# Patient Record
Sex: FEMALE | Race: WHITE | NOT HISPANIC OR LATINO
[De-identification: names, ages, dates, MRNs, and addresses within clinical notes are randomized per-mention and may not be internally consistent; named-entity substitution may affect disease eponyms.]

---

## 2018-12-14 ENCOUNTER — RESULT REVIEW (OUTPATIENT)
Age: 58
End: 2018-12-14

## 2019-10-23 ENCOUNTER — RECORD ABSTRACTING (OUTPATIENT)
Age: 59
End: 2019-10-23

## 2019-10-23 DIAGNOSIS — Z80.0 FAMILY HISTORY OF MALIGNANT NEOPLASM OF DIGESTIVE ORGANS: ICD-10-CM

## 2019-10-23 DIAGNOSIS — Z87.891 PERSONAL HISTORY OF NICOTINE DEPENDENCE: ICD-10-CM

## 2019-10-23 DIAGNOSIS — Z80.8 FAMILY HISTORY OF MALIGNANT NEOPLASM OF OTHER ORGANS OR SYSTEMS: ICD-10-CM

## 2019-10-23 PROBLEM — Z00.00 ENCOUNTER FOR PREVENTIVE HEALTH EXAMINATION: Status: ACTIVE | Noted: 2019-10-23

## 2019-10-23 RX ORDER — CHROMIUM 200 MCG
1000 TABLET ORAL DAILY
Refills: 0 | Status: ACTIVE | COMMUNITY

## 2019-10-25 ENCOUNTER — APPOINTMENT (OUTPATIENT)
Dept: ENDOCRINOLOGY | Facility: CLINIC | Age: 59
End: 2019-10-25
Payer: COMMERCIAL

## 2019-10-25 VITALS
SYSTOLIC BLOOD PRESSURE: 135 MMHG | BODY MASS INDEX: 28.68 KG/M2 | HEIGHT: 64 IN | WEIGHT: 168 LBS | DIASTOLIC BLOOD PRESSURE: 80 MMHG | HEART RATE: 70 BPM

## 2019-10-25 PROCEDURE — 99214 OFFICE O/P EST MOD 30 MIN: CPT

## 2019-11-01 NOTE — ASSESSMENT
[FreeTextEntry1] : ~\par Striving to lose weight, but without success.\par This has been frustrating.\par I explained current medical options, which unfortunately, are limited.\par She is very much interested to try phentermine.\par Risks, benefits, options discussed and she will start on 1/4 of 37 mg tablet and\par notify me over weekend as to how she is doing.

## 2019-11-01 NOTE — PHYSICAL EXAM
[Alert] : alert [No Acute Distress] : no acute distress [Well Nourished] : well nourished [Well Developed] : well developed [Healthy Appearance] : healthy appearance [Normal Voice/Communication] : normal voice communication [No Proptosis] : no proptosis [No Lid Lag] : no lid lag [No Neck Mass] : no neck mass was observed [No Respiratory Distress] : no respiratory distress [Normal Rate and Effort] : normal respiratory rhythm and effort [No Accessory Muscle Use] : no accessory muscle use [Normal Rate] : heart rate was normal  [Regular Rhythm] : with a regular rhythm [No Edema] : there was no peripheral edema [No Stigmata of Cushings Syndrome] : no stigmata of cushings syndrome [Oriented x3] : oriented to person, place, and time [Normal Insight/Judgement] : insight and judgment were intact [Normal Affect] : the affect was normal [Normal Mood] : the mood was normal

## 2019-11-01 NOTE — HISTORY OF PRESENT ILLNESS
[FreeTextEntry1] : Oct 25, 2019\par \par          PCP: Dr. Asad Haywood\par             Gyn: Dr. Sara Roman\par            .\par            CC: Weight gain; acne\par \par Here last Fall at which time labs all wnl.\par Had recent near syncopal episode at [facility] after donating blood.  \par            .\par I don't want to go on keto diet.  I don't eat meat.  My daughter is a paramedic, so she can check my blood pressure.\par \par Plan:  She would like phentermine.  Warned of potential side effects.\par \par \par Previous notes from [employer] appended below.\par \par   Sept 12, 2018\par            .\par            PCP: Dr. Lisa Alvarado\par             Gyn: Dr. 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 \par            Her mother sees me for bone density issues.\par            Available old records reviewed and not presented again here. \par            .\par            Mother of two (18 and 21).\par            .\par            5 ft 3 in tall; about 125 after high school.\par            Started gaining weight about two years ago. \par            Today 178 lbs.\par            Not certain of year of menopause as she was on BCP.\par            No hot flashes.\par            .\par            Impression: Reason for weight gain, acne not clear.\par            .\par            Plan: Will start evaluation by checking androgens, cortisol.\par            Possible 24 hour urine free cortisol in future as well as overnight dexamethasone suppression test. \par            .

## 2019-11-19 ENCOUNTER — APPOINTMENT (OUTPATIENT)
Dept: ENDOCRINOLOGY | Facility: CLINIC | Age: 59
End: 2019-11-19
Payer: COMMERCIAL

## 2019-11-19 VITALS
HEART RATE: 80 BPM | HEIGHT: 64 IN | DIASTOLIC BLOOD PRESSURE: 90 MMHG | SYSTOLIC BLOOD PRESSURE: 138 MMHG | WEIGHT: 168 LBS | BODY MASS INDEX: 28.68 KG/M2

## 2019-11-19 DIAGNOSIS — Z78.9 OTHER SPECIFIED HEALTH STATUS: ICD-10-CM

## 2019-11-19 DIAGNOSIS — Z87.2 PERSONAL HISTORY OF DISEASES OF THE SKIN AND SUBCUTANEOUS TISSUE: ICD-10-CM

## 2019-11-19 PROCEDURE — 99214 OFFICE O/P EST MOD 30 MIN: CPT | Mod: 25

## 2019-11-19 PROCEDURE — 36415 COLL VENOUS BLD VENIPUNCTURE: CPT

## 2019-11-20 LAB
ALBUMIN SERPL ELPH-MCNC: 4.9 G/DL
ALP BLD-CCNC: 77 U/L
ALT SERPL-CCNC: 12 U/L
ANION GAP SERPL CALC-SCNC: 15 MMOL/L
AST SERPL-CCNC: 15 U/L
BASOPHILS # BLD AUTO: 0.06 K/UL
BASOPHILS NFR BLD AUTO: 0.7 %
BILIRUB DIRECT SERPL-MCNC: 0.1 MG/DL
BILIRUB INDIRECT SERPL-MCNC: 0.2 MG/DL
BILIRUB SERPL-MCNC: 0.3 MG/DL
BUN SERPL-MCNC: 14 MG/DL
CALCIUM SERPL-MCNC: 10.2 MG/DL
CHLORIDE SERPL-SCNC: 104 MMOL/L
CO2 SERPL-SCNC: 25 MMOL/L
CREAT SERPL-MCNC: 0.89 MG/DL
EOSINOPHIL # BLD AUTO: 0.12 K/UL
EOSINOPHIL NFR BLD AUTO: 1.4 %
FRUCTOSAMINE SERPL-MCNC: 242 UMOL/L
GLUCOSE SERPL-MCNC: 89 MG/DL
HCT VFR BLD CALC: 45.2 %
HGB BLD-MCNC: 14.4 G/DL
IMM GRANULOCYTES NFR BLD AUTO: 0.2 %
LYMPHOCYTES # BLD AUTO: 3.25 K/UL
LYMPHOCYTES NFR BLD AUTO: 38.4 %
MAN DIFF?: NORMAL
MCHC RBC-ENTMCNC: 29.3 PG
MCHC RBC-ENTMCNC: 31.9 GM/DL
MCV RBC AUTO: 92.1 FL
MONOCYTES # BLD AUTO: 0.58 K/UL
MONOCYTES NFR BLD AUTO: 6.8 %
NEUTROPHILS # BLD AUTO: 4.44 K/UL
NEUTROPHILS NFR BLD AUTO: 52.5 %
PLATELET # BLD AUTO: 250 K/UL
POTASSIUM SERPL-SCNC: 4.7 MMOL/L
PROT SERPL-MCNC: 7.2 G/DL
RBC # BLD: 4.91 M/UL
RBC # FLD: 13.6 %
SODIUM SERPL-SCNC: 144 MMOL/L
WBC # FLD AUTO: 8.47 K/UL

## 2019-11-22 PROBLEM — Z78.9 PATIENT DENIES MEDICAL PROBLEMS: Status: RESOLVED | Noted: 2019-11-19 | Resolved: 2019-11-22

## 2019-11-22 NOTE — ASSESSMENT
[FreeTextEntry1] : &\par \par Weight stable.\par No apparent side effects.\par BP on high side today, so will not increase phentirmine, but will start topiramate in PM\par Set up regular follow up visits.

## 2019-11-22 NOTE — HISTORY OF PRESENT ILLNESS
[FreeTextEntry1] : Nov 19, 2019\par \par PCP: Dr. Asad Haywood\par             Gyn: Dr. Sara Roman\par            .\par            CC: Weight gain; acne\par \par Weight by me today w clothes and shoes:  168 lbs (room 3)\par Notes her appetite is decreased in AM but medication wears off.\par Would like to increase dose.  \par BP today 138/90 ("coffee and rushing" she explains)  \par BP in past was within normal limits.\par She reports her EMT daughter has been checking her BP and has been fine.\par Will not increase dose of phentermine at present, will add topirimate 25 mg in PM for now and then\par eventually BID.    After BP monitored by me as well as evaluation by PCP, can consider raising dose of\par phentermine.  \par \par Oct 25, 2019\par \par          PCP: Dr. Asad Haywood\par             Gyn: Dr. Sara Roman\par            .\par            CC: Weight gain; acne\par \par Here last Fall at which time labs all wnl.\par Had recent near syncopal episode at [facility] after donating blood.  \par            .\par I don't want to go on keto diet.  I don't eat meat.  My daughter is a paramedic, so she can check my blood pressure.\par \par Plan:  She would like phentermine.  Warned of potential side effects.\par \par \par Previous notes from [employer] Works appended below.\par \par   Sept 12, 2018\par            .\par            PCP: Dr. Lisa Alvarado\par             Gyn: Dr. 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 \par            Her mother sees me for bone density issues.\par            Available old records reviewed and not presented again here. \par            .\par            Mother of two (18 and 21).\par            .\par            5 ft 3 in tall; about 125 after high school.\par            Started gaining weight about two years ago. \par            Today 178 lbs.\par            Not certain of year of menopause as she was on BCP.\par            No hot flashes.\par            .\par            Impression: Reason for weight gain, acne not clear.\par            .\par            Plan: Will start evaluation by checking androgens, cortisol.\par            Possible 24 hour urine free cortisol in future as well as overnight dexamethasone suppression test. \par            .

## 2019-11-22 NOTE — PHYSICAL EXAM
[Alert] : alert [No Acute Distress] : no acute distress [Well Nourished] : well nourished [Well Developed] : well developed [Healthy Appearance] : healthy appearance [Normal Voice/Communication] : normal voice communication [No Proptosis] : no proptosis [No Lid Lag] : no lid lag [No Respiratory Distress] : no respiratory distress [Normal Rate] : heart rate was normal  [Regular Rhythm] : with a regular rhythm [No Involuntary Movements] : no involuntary movements were seen [No Tremors] : no tremors [Oriented x3] : oriented to person, place, and time [Normal Insight/Judgement] : insight and judgment were intact [Normal Affect] : the affect was normal [Normal Mood] : the mood was normal

## 2019-12-13 ENCOUNTER — TRANSCRIPTION ENCOUNTER (OUTPATIENT)
Age: 59
End: 2019-12-13

## 2019-12-18 ENCOUNTER — APPOINTMENT (OUTPATIENT)
Dept: INTERNAL MEDICINE | Facility: CLINIC | Age: 59
End: 2019-12-18
Payer: COMMERCIAL

## 2019-12-18 VITALS
SYSTOLIC BLOOD PRESSURE: 128 MMHG | HEIGHT: 64 IN | BODY MASS INDEX: 27.49 KG/M2 | DIASTOLIC BLOOD PRESSURE: 78 MMHG | WEIGHT: 161 LBS

## 2019-12-18 DIAGNOSIS — M72.2 PLANTAR FASCIAL FIBROMATOSIS: ICD-10-CM

## 2019-12-18 PROCEDURE — 99386 PREV VISIT NEW AGE 40-64: CPT | Mod: 25

## 2019-12-18 PROCEDURE — 36415 COLL VENOUS BLD VENIPUNCTURE: CPT

## 2019-12-18 NOTE — HEALTH RISK ASSESSMENT
[Good] : ~his/her~ current health as good [Very Good] : ~his/her~  mood as very good [No] : In the past 12 months have you used drugs other than those required for medical reasons? No [No falls in past year] : Patient reported no falls in the past year [0] : 2) Feeling down, depressed, or hopeless: Not at all (0) [HIV test declined] : HIV test declined [Hepatitis C test declined] : Hepatitis C test declined [Patient reported mammogram was normal] : Patient reported mammogram was normal [Patient reported colonoscopy was normal] : Patient reported colonoscopy was normal [FreeTextEntry1] : none [de-identified] : none [de-identified] : regular diet  [] : No [MammogramDate] : 01/18 [PapSmearDate] : 06/19 [PapSmearComments] : Dr Roman [ColonoscopyDate] : 01/14 [ColonoscopyComments] : Dr Maher

## 2019-12-18 NOTE — PHYSICAL EXAM
[No Acute Distress] : no acute distress [Well Nourished] : well nourished [Well-Appearing] : well-appearing [Well Developed] : well developed [PERRL] : pupils equal round and reactive to light [Normal Sclera/Conjunctiva] : normal sclera/conjunctiva [Normal Outer Ear/Nose] : the outer ears and nose were normal in appearance [EOMI] : extraocular movements intact [Normal Oropharynx] : the oropharynx was normal [No Lymphadenopathy] : no lymphadenopathy [No JVD] : no jugular venous distention [Supple] : supple [No Respiratory Distress] : no respiratory distress  [Thyroid Normal, No Nodules] : the thyroid was normal and there were no nodules present [No Accessory Muscle Use] : no accessory muscle use [Normal Rate] : normal rate  [Clear to Auscultation] : lungs were clear to auscultation bilaterally [Regular Rhythm] : with a regular rhythm [No Murmur] : no murmur heard [Normal S1, S2] : normal S1 and S2 [No Varicosities] : no varicosities [No Carotid Bruits] : no carotid bruits [No Edema] : there was no peripheral edema [Pedal Pulses Present] : the pedal pulses are present [No Extremity Clubbing/Cyanosis] : no extremity clubbing/cyanosis [Soft] : abdomen soft [Non Tender] : non-tender [Non-distended] : non-distended [No HSM] : no HSM [Normal Bowel Sounds] : normal bowel sounds [Normal Anterior Cervical Nodes] : no anterior cervical lymphadenopathy [Normal Posterior Cervical Nodes] : no posterior cervical lymphadenopathy [No CVA Tenderness] : no CVA  tenderness [No Spinal Tenderness] : no spinal tenderness [No Joint Swelling] : no joint swelling [No Rash] : no rash [Coordination Grossly Intact] : coordination grossly intact [Grossly Normal Strength/Tone] : grossly normal strength/tone [No Focal Deficits] : no focal deficits [Normal Gait] : normal gait [Normal Insight/Judgement] : insight and judgment were intact [Normal Affect] : the affect was normal [Normal Appearance] : normal in appearance [No Nipple Discharge] : no nipple discharge [No Masses] : no palpable masses [No Axillary Lymphadenopathy] : no axillary lymphadenopathy [Alert and Oriented x3] : oriented to person, place, and time [Normal Mood] : the mood was normal [de-identified] : cannot visualize left TM due to cerumen [de-identified] : many moles, none suspicious

## 2019-12-18 NOTE — HISTORY OF PRESENT ILLNESS
[FreeTextEntry1] : physical [de-identified] : Pt here for annual physical. She feels well overall. No complaints. She has been seeing endo due to weight gain. She is on two meds but has not lost weight as yet. Pt is 3 years menopausal. She tries to consume a proper diet but does no exercise. She works a lot and cannot seem to find the time to exercise. \par She has some issues with plantar fasciitis and would like to see Pod\par She also req a derm recommendation for annual skin check.

## 2019-12-18 NOTE — COUNSELING
[Encouraged to increase physical activity] : Encouraged to increase physical activity [Potential consequences of obesity discussed] : Potential consequences of obesity discussed [Encouraged to use exercise tracking device] : Encouraged to use exercise tracking device [FreeTextEntry2] : .nutsc

## 2019-12-19 LAB
25(OH)D3 SERPL-MCNC: 30.6 NG/ML
CHOLEST SERPL-MCNC: 207 MG/DL
CHOLEST/HDLC SERPL: 2.7 RATIO
ESTIMATED AVERAGE GLUCOSE: 105 MG/DL
HBA1C MFR BLD HPLC: 5.3 %
HDLC SERPL-MCNC: 76 MG/DL
LDLC SERPL CALC-MCNC: 111 MG/DL
TRIGL SERPL-MCNC: 100 MG/DL
TSH SERPL-ACNC: 2.01 UIU/ML

## 2020-01-06 ENCOUNTER — RX RENEWAL (OUTPATIENT)
Age: 60
End: 2020-01-06

## 2020-01-06 RX ORDER — ACYCLOVIR 50 MG/G
5 CREAM TOPICAL
Qty: 1 | Refills: 3 | Status: ACTIVE | COMMUNITY
Start: 2020-01-06 | End: 1900-01-01

## 2020-02-12 ENCOUNTER — APPOINTMENT (OUTPATIENT)
Dept: ENDOCRINOLOGY | Facility: CLINIC | Age: 60
End: 2020-02-12

## 2020-04-20 ENCOUNTER — APPOINTMENT (OUTPATIENT)
Dept: ENDOCRINOLOGY | Facility: CLINIC | Age: 60
End: 2020-04-20
Payer: COMMERCIAL

## 2020-04-20 PROCEDURE — 99214 OFFICE O/P EST MOD 30 MIN: CPT | Mod: 95

## 2020-04-20 NOTE — ASSESSMENT
[FreeTextEntry1] : Lots of food donations at hospital....and hospital food is free....not to her benefit while trying to lose weight.

## 2020-04-20 NOTE — HISTORY OF PRESENT ILLNESS
[Home] : at home, [unfilled] , at the time of the visit. [Medical Office: ([facility])___] : at the medical office located in  [Patient] : the patient [Self] : self [FreeTextEntry1] : Apr 20, 2020\par \par This is a 21+ minute VideoChat encounter with an established patient which was initiated by the patient during a time scheduled for a visit and the patient is aware that this may be a billable encounter.  The patient has not seen a provider of my specialty (Endocrinology) within out group in the past 7 days and this encounter is not anticipated to result in a scheduled in-person visit within he next 7 days.\par The reason for this VideoChat encounter is listed below under "CC:"\par Verbal consent was discussed and obtained from the patient for this visit:  "You have chosen to receive care through the use of tele-media or telephone.   This enables health care providers at different locations to provide safe, effective, and convenient care through the use of technology.  Please note this is a billable encounter.  As with any health care service, there are risks associated with the use of tele-media or telephone, including equipment failure, poor image and/or resolution, and  issues.  You understand that I cannot physically examine you and that you may need to come to the office to complete the assessment.\par \par Patient agreed verbally to understanding the risks, benefits, alternatives of Tele-Media and telephone as explained.  All questions regarding tele-media and telephone encounters were answered.\par \par 58 yo healthcare .\par Her daughter, EMT, has been assigned to [employer] - checks her BP - most recently 138/76.\par Patient ran out of topirimate and phentermine.\par \par Plan:  Restart topirimate 25 mg in PM and phentermine 1/4 of a 37 mg tablet.\par Call me over the weekend after her daughter checks BP to consider increasing dose to 1/2 tab of phentermine.\par Schedule f/u appt for about 6-8 weeks - before she runs out of phentermine.\par \par \par \par Nov 19, 2019\par \par PCP: Dr. Asad Haywood\par             Gyn: Dr. Sara Roman\par            .\par            CC: Weight gain; acne\par \par Weight by me today w clothes and shoes:  168 lbs (room 3)\par Notes her appetite is decreased in AM but medication wears off.\par Would like to increase dose.  \par BP today 138/90 ("coffee and rushing" she explains)  \par BP in past was within normal limits.\par She reports her EMT daughter has been checking her BP and has been fine.\par Will not increase dose of phentermine at present, will add topirimate 25 mg in PM for now and then\par eventually BID.    After BP monitored by me as well as evaluation by PCP, can consider raising dose of\par phentermine.  \par \par Oct 25, 2019\par \par          PCP: Dr. Asad Haywood\par             Gyn: Dr. Sara Roman\par            .\par            CC: Weight gain; acne\par \par Here last Fall at which time labs all wnl.\par Had recent near syncopal episode at [facility] after donating blood.  \par            .\par I don't want to go on keto diet.  I don't eat meat.  My daughter is a paramedic, so she can check my blood pressure.\par \par Plan:  She would like phentermine.  Warned of potential side effects.\par \par \par Previous notes from eClinical Works appended below.\par \par   Sept 12, 2018\par            .\par            PCP: Dr. Lisa Alvarado\par             Gyn: Dr. 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 \par            Her mother sees me for bone density issues.\par            Available old records reviewed and not presented again here. \par            .\par            Mother of two (18 and 21).\par            .\par            5 ft 3 in tall; about 125 after high school.\par            Started gaining weight about two years ago. \par            Today 178 lbs.\par            Not certain of year of menopause as she was on BCP.\par            No hot flashes.\par            .\par            Impression: Reason for weight gain, acne not clear.\par            .\par            Plan: Will start evaluation by checking androgens, cortisol.\par            Possible 24 hour urine free cortisol in future as well as overnight dexamethasone suppression test. \par            .

## 2020-05-27 ENCOUNTER — APPOINTMENT (OUTPATIENT)
Dept: ENDOCRINOLOGY | Facility: CLINIC | Age: 60
End: 2020-05-27
Payer: COMMERCIAL

## 2020-05-27 PROCEDURE — 99214 OFFICE O/P EST MOD 30 MIN: CPT | Mod: 95

## 2020-05-28 NOTE — HISTORY OF PRESENT ILLNESS
[Home] : at home, [unfilled] , at the time of the visit. [Medical Office: ([facility])___] : at the medical office located in  [Verbal consent obtained from patient] : the patient, [unfilled] [FreeTextEntry1] : May 27, 2020   VideoChat  Facetime\par \par PCP: Dr. Asad Haywood\par             Gyn: Dr. Sara Roman\par            .\par            CC: Weight gain; acne\par \par Gradually increased phentermine 37 mg from 1/4 tab daily to 1/2 tab daily.    Has found it effective.\par BP being monitored and remains within normal range.\par She feels well.\par No side effects noted.\par \par Impression:  Has noted decreased appetite, good effect thus far.  Under monitoring.\par \par Plan:  Continue 1/2 tablet phentermine for now.\par ROV within 80 days (in person).  \par \par \par Apr 20, 2020\par \par This is a 21+ minute VideoChat encounter with an established patient which was initiated by the patient during a time scheduled for a visit and the patient is aware that this may be a billable encounter.  The patient has not seen a provider of my specialty (Endocrinology) within out group in the past 7 days and this encounter is not anticipated to result in a scheduled in-person visit within he next 7 days.\par The reason for this VideoChat encounter is listed below under "CC:"\par Verbal consent was discussed and obtained from the patient for this visit:  "You have chosen to receive care through the use of tele-media or telephone.   This enables health care providers at different locations to provide safe, effective, and convenient care through the use of technology.  Please note this is a billable encounter.  As with any health care service, there are risks associated with the use of tele-media or telephone, including equipment failure, poor image and/or resolution, and  issues.  You understand that I cannot physically examine you and that you may need to come to the office to complete the assessment.\par \par Patient agreed verbally to understanding the risks, benefits, alternatives of Tele-Media and telephone as explained.  All questions regarding tele-media and telephone encounters were answered.\par \par 60 yo healthcare .\par Her daughter, EMT, has been assigned to [employer] - checks her BP - most recently 138/76.\par Patient ran out of topirimate and phentermine.\par \par Plan:  Restart topirimate 25 mg in PM and phentermine 1/4 of a 37 mg tablet.\par Call me over the weekend after her daughter checks BP to consider increasing dose to 1/2 tab of phentermine.\par Schedule f/u appt for about 6-8 weeks - before she runs out of phentermine.\par \par \par \par Nov 19, 2019\par \par PCP: Dr. Asad Haywood\par             Gyn: Dr. Sara Roman\par            .\par            CC: Weight gain; acne\par \par Weight by me today w clothes and shoes:  168 lbs (room 3)\par Notes her appetite is decreased in AM but medication wears off.\par Would like to increase dose.  \par BP today 138/90 ("coffee and rushing" she explains)  \par BP in past was within normal limits.\par She reports her EMT daughter has been checking her BP and has been fine.\par Will not increase dose of phentermine at present, will add topirimate 25 mg in PM for now and then\par eventually BID.    After BP monitored by me as well as evaluation by PCP, can consider raising dose of\par phentermine.  \par \par Oct 25, 2019\par \par          PCP: Dr. Asad Haywood\par             Gyn: Dr. Sara Roman\par            .\par            CC: Weight gain; acne\par \par Here last Fall at which time labs all wnl.\par Had recent near syncopal episode at [facility] after donating blood.  \par            .\par I don't want to go on keto diet.  I don't eat meat.  My daughter is a paramedic, so she can check my blood pressure.\par \par Plan:  She would like phentermine.  Warned of potential side effects.\par \par \par Previous notes from eClinical Works appended below.\par \par   Sept 12, 2018\par            .\par            PCP: Dr. Lisa Alvarado\par             Gyn: Dr. 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 \par            Her mother sees me for bone density issues.\par            Available old records reviewed and not presented again here. \par            .\par            Mother of two (18 and 21).\par            .\par            5 ft 3 in tall; about 125 after high school.\par            Started gaining weight about two years ago. \par            Today 178 lbs.\par            Not certain of year of menopause as she was on BCP.\par            No hot flashes.\par            .\par            Impression: Reason for weight gain, acne not clear.\par            .\par            Plan: Will start evaluation by checking androgens, cortisol.\par            Possible 24 hour urine free cortisol in future as well as overnight dexamethasone suppression test. \par            .

## 2020-05-28 NOTE — ASSESSMENT
[FreeTextEntry1] : BP beiing monitored at home and is within normal range.\par Notes decreased appetite on phentermine and believes she is achieving weight loss.\par ROV within 80 days.

## 2020-09-01 ENCOUNTER — APPOINTMENT (OUTPATIENT)
Dept: ENDOCRINOLOGY | Facility: CLINIC | Age: 60
End: 2020-09-01
Payer: COMMERCIAL

## 2020-09-01 PROCEDURE — 99214 OFFICE O/P EST MOD 30 MIN: CPT | Mod: 95

## 2020-09-01 NOTE — HISTORY OF PRESENT ILLNESS
[Home] : at home, [unfilled] , at the time of the visit. [Medical Office: ([facility])___] : at the medical office located in  [Verbal consent obtained from patient] : the patient, [unfilled] [FreeTextEntry1] : Sep 01, 2020     Videochat   i     ref [id number]\par \par PCP: Dr. Asad Haywood\par             Gyn: Dr. 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   \par Patient has increased phentermine 37 mg from 1/4 tab daily to 1/2 tab daily.    \par Weight 157\par 136/82 BP and pulse 80 \par \par Remains on phentermine 1/2 of a 37.5 mg tab w/o side effects.\par \par \par \par May 27, 2020   VideoChat  Facetime\par \par PCP: Dr. Asad Haywood\par             Gyn: Dr. Sara Roman\par            .\par            CC: Weight gain; acne\par \par Gradually increased phentermine 37 mg from 1/4 tab daily to 1/2 tab daily.    Has found it effective.\par BP being monitored and remains within normal range.\par She feels well.\par No side effects noted.\par \par Impression:  Has noted decreased appetite, good effect thus far.  Under monitoring.\par \par Plan:  Continue 1/2 tablet phentermine for now.\par ROV within 80 days (in person).  \par \par \par Apr 20, 2020\par \par This is a 21+ minute VideoChat encounter with an established patient which was initiated by the patient during a time scheduled for a visit and the patient is aware that this may be a billable encounter.  The patient has not seen a provider of my specialty (Endocrinology) within out group in the past 7 days and this encounter is not anticipated to result in a scheduled in-person visit within he next 7 days.\par The reason for this VideoChat encounter is listed below under "CC:"\par Verbal consent was discussed and obtained from the patient for this visit:  "You have chosen to receive care through the use of tele-media or telephone.   This enables health care providers at different locations to provide safe, effective, and convenient care through the use of technology.  Please note this is a billable encounter.  As with any health care service, there are risks associated with the use of tele-media or telephone, including equipment failure, poor image and/or resolution, and  issues.  You understand that I cannot physically examine you and that you may need to come to the office to complete the assessment.\par \par Patient agreed verbally to understanding the risks, benefits, alternatives of Tele-Media and telephone as explained.  All questions regarding tele-media and telephone encounters were answered.\par \par 58 yo healthcare .\par Her daughter, EMT, has been assigned to [employer] - checks her BP - most recently 138/76.\par Patient ran out of topirimate and phentermine.\par \par Plan:  Restart topirimate 25 mg in PM and phentermine 1/4 of a 37 mg tablet.\par Call me over the weekend after her daughter checks BP to consider increasing dose to 1/2 tab of phentermine.\par Schedule f/u appt for about 6-8 weeks - before she runs out of phentermine.\par \par \par \par Nov 19, 2019\par \par PCP: Dr. Asad Haywood\par             Gyn: Dr. Sara Roman\par            .\par            CC: Weight gain; acne\par \par Weight by me today w clothes and shoes:  168 lbs (room 3)\par Notes her appetite is decreased in AM but medication wears off.\par Would like to increase dose.  \par BP today 138/90 ("coffee and rushing" she explains)  \par BP in past was within normal limits.\par She reports her EMT daughter has been checking her BP and has been fine.\par Will not increase dose of phentermine at present, will add topirimate 25 mg in PM for now and then\par eventually BID.    After BP monitored by me as well as evaluation by PCP, can consider raising dose of\par phentermine.  \par \par Oct 25, 2019\par \par          PCP: Dr. Asad Haywood\par             Gyn: Dr. Sara Roman\par            .\par            CC: Weight gain; acne\par \par Here last Fall at which time labs all wnl.\par Had recent near syncopal episode at [facility] after donating blood.  \par            .\par I don't want to go on keto diet.  I don't eat meat.  My daughter is a paramedic, so she can check my blood pressure.\par \par Plan:  She would like phentermine.  Warned of potential side effects.\par \par \par Previous notes from [employer] appended below.\par \par   Sept 12, 2018\par            .\par            PCP: Dr. Lisa Alvarado\par             Gyn: Dr. 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 \par            Her mother sees me for bone density issues.\par            Available old records reviewed and not presented again here. \par            .\par            Mother of two (18 and 21).\par            .\par            5 ft 3 in tall; about 125 after high school.\par            Started gaining weight about two years ago. \par            Today 178 lbs.\par            Not certain of year of menopause as she was on BCP.\par            No hot flashes.\par            .\par            Impression: Reason for weight gain, acne not clear.\par            .\par            Plan: Will start evaluation by checking androgens, cortisol.\par            Possible 24 hour urine free cortisol in future as well as overnight dexamethasone suppression test. \par            .

## 2020-09-01 NOTE — ASSESSMENT
[FreeTextEntry1] : Has been doing well on phentermine 1/2 tab of 37.5 mg.\par BP, pulse in good range.\par \par Rx renewed.

## 2020-10-17 ENCOUNTER — RX RENEWAL (OUTPATIENT)
Age: 60
End: 2020-10-17

## 2021-03-04 ENCOUNTER — APPOINTMENT (OUTPATIENT)
Dept: ENDOCRINOLOGY | Facility: CLINIC | Age: 61
End: 2021-03-04
Payer: COMMERCIAL

## 2021-03-04 PROCEDURE — 99214 OFFICE O/P EST MOD 30 MIN: CPT | Mod: 95

## 2021-03-08 NOTE — HISTORY OF PRESENT ILLNESS
[Home] : at home, [unfilled] , at the time of the visit. [Medical Office: ([facility])___] : at the medical office located in  [Verbal consent obtained from patient] : the patient, [unfilled] [FreeTextEntry1] : Mar 04, 2021      [id number]\par \par PCP: Dr. Asad Haywood\par             Gyn: Dr. Sara Roman\par            .\par            CC: Weight gain; acne       BMI 28.84 on 10/25/19\par \par /80 last night by her paramedic daughter.      Weight this AM - clothes, no shoes - 157.6  lbs\par Stopped the phentermine about a month ago.  \par Volunteered at [employer].  \par \par After stopped phentermine gained 4 lbs.  \par \par \par Impression:  Has been on phentirmine  1/2 of a 37.5 mg tab, without side effects, with benefit and wishes to continue.\par \par Plan:  [facility] from January 25 reviewed.  \par Updated lab tests before next visit.\par With successful weight loss it should be possible to decrease dose of phentermine in expectation of tapering off.  (No longer on topirimate). \par \par \par Sep 01, 2020     Videochat  [phone] i     ref [id number]\par \par PCP: Dr. Asad Haywood\par             Gyn: Dr. 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   \par Patient has increased phentermine 37 mg from 1/4 tab daily to 1/2 tab daily.    \par Weight 157\par 136/82 BP and pulse 80 \par \par Remains on phentermine 1/2 of a 37.5 mg tab w/o side effects.\par \par \par \par May 27, 2020   VideoChat  Facetime\par \par PCP: Dr. Asad Haywood\par             Gyn: Dr. Sara Roman\par            .\par            CC: Weight gain; acne\par \par Gradually increased phentermine 37 mg from 1/4 tab daily to 1/2 tab daily.    Has found it effective.\par BP being monitored and remains within normal range.\par She feels well.\par No side effects noted.\par \par Impression:  Has noted decreased appetite, good effect thus far.  Under monitoring.\par \par Plan:  Continue 1/2 tablet phentermine for now.\par ROV within 80 days (in person).  \par \par \par Apr 20, 2020\par \par This is a 21+ minute VideoChat encounter with an established patient which was initiated by the patient during a time scheduled for a visit and the patient is aware that this may be a billable encounter.  The patient has not seen a provider of my specialty (Endocrinology) within out group in the past 7 days and this encounter is not anticipated to result in a scheduled in-person visit within he next 7 days.\par The reason for this VideoChat encounter is listed below under "CC:"\par Verbal consent was discussed and obtained from the patient for this visit:  "You have chosen to receive care through the use of tele-media or telephone.   This enables health care providers at different locations to provide safe, effective, and convenient care through the use of technology.  Please note this is a billable encounter.  As with any health care service, there are risks associated with the use of tele-media or telephone, including equipment failure, poor image and/or resolution, and  issues.  You understand that I cannot physically examine you and that you may need to come to the office to complete the assessment.\par \par Patient agreed verbally to understanding the risks, benefits, alternatives of Tele-Media and telephone as explained.  All questions regarding tele-media and telephone encounters were answered.\par \par 58 yo healthcare .\par Her daughter, EMT, has been assigned to [employer] - checks her BP - most recently 138/76.\par Patient ran out of topirimate and phentermine.\par \par Plan:  Restart topirimate 25 mg in PM and phentermine 1/4 of a 37 mg tablet.\par Call me over the weekend after her daughter checks BP to consider increasing dose to 1/2 tab of phentermine.\par Schedule f/u appt for about 6-8 weeks - before she runs out of phentermine.\par \par \par \par Nov 19, 2019\par \par PCP: Dr. Asad Haywood\par             Gyn: Dr. Sara Roman\par            .\par            CC: Weight gain; acne\par \par Weight by me today w clothes and shoes:  168 lbs (room 3)\par Notes her appetite is decreased in AM but medication wears off.\par Would like to increase dose.  \par BP today 138/90 ("coffee and rushing" she explains)  \par BP in past was within normal limits.\par She reports her EMT daughter has been checking her BP and has been fine.\par Will not increase dose of phentermine at present, will add topirimate 25 mg in PM for now and then\par eventually BID.    After BP monitored by me as well as evaluation by PCP, can consider raising dose of\par phentermine.  \par \par Oct 25, 2019\par \par          PCP: Dr. Asad Haywood\par             Gyn: Dr. Sara Roman\par            .\par            CC: Weight gain; acne\par \par Here last Fall at which time labs all wnl.\par Had recent near syncopal episode at [facility] after donating blood.  \par            .\par I don't want to go on keto diet.  I don't eat meat.  My daughter is a paramedic, so she can check my blood pressure.\par \par Plan:  She would like phentermine.  Warned of potential side effects.\par \par \par Previous notes from [employer] appended below.\par \par   Sept 12, 2018\par            .\par            PCP: Dr. Lisa Alvarado\par             Gyn: Dr. 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 \par            Her mother sees me for bone density issues.\par            Available old records reviewed and not presented again here. \par            .\par            Mother of two (18 and 21).\par            .\par            5 ft 3 in tall; about 125 after high school.\par            Started gaining weight about two years ago. \par            Today 178 lbs.\par            Not certain of year of menopause as she was on BCP.\par            No hot flashes.\par            .\par            Impression: Reason for weight gain, acne not clear.\par            .\par            Plan: Will start evaluation by checking androgens, cortisol.\par            Possible 24 hour urine free cortisol in future as well as overnight dexamethasone suppression test. \par            .

## 2021-05-06 ENCOUNTER — RESULT REVIEW (OUTPATIENT)
Age: 61
End: 2021-05-06

## 2021-06-22 ENCOUNTER — APPOINTMENT (OUTPATIENT)
Dept: ENDOCRINOLOGY | Facility: CLINIC | Age: 61
End: 2021-06-22
Payer: COMMERCIAL

## 2021-06-22 PROCEDURE — 99214 OFFICE O/P EST MOD 30 MIN: CPT | Mod: 95

## 2021-06-22 NOTE — HISTORY OF PRESENT ILLNESS
[FreeTextEntry1] : Jun 22, 2021      i \par \par PCP: Dr. Asad Haywood\par             Gyn: Dr. Sara Roman\par            .\par            CC: Weight gain; acne       BMI 28.84 on 10/25/19\par \par phentermine 37 mg from 1/4 tab daily to 1/2 tab daily\par \par \par \par Weight 149 - 154 lbs\par 5 ft 4 in tall.\par Goal is to 140 lbs. \par \par \par \par Mar 04, 2021      [id number]\par \par PCP: Dr. Asad Haywood\par             Gyn: Dr. Sara Roman\par            .\par            CC: Weight gain; acne       BMI 28.84 on 10/25/19\par \par /80 last night by her paramedic daughter.      Weight this AM - clothes, no shoes - 157.6  lbs\par Stopped the phentermine about a month ago.  \par Volunteered at [email or web address].  \par \par After stopped phentermine gained 4 lbs.  \par \par \par Impression:  Has been on phentirmine  1/2 of a 37.5 mg tab, without side effects, with benefit and wishes to continue.\par \par Plan:  [facility] from January 25 reviewed.  \par Updated lab tests before next visit.\par With successful weight loss it should be possible to decrease dose of phentermine in expectation of tapering off.  (No longer on topirimate). \par \par \par Sep 01, 2020     Videochat   i     ref [id number]\par \par PCP: Dr. Asad Haywood\par             Gyn: Dr. 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   \par Patient has increased phentermine 37 mg from 1/4 tab daily to 1/2 tab daily.    \par Weight 157\par 136/82 BP and pulse 80 \par \par Remains on phentermine 1/2 of a 37.5 mg tab w/o side effects.\par \par \par \par May 27, 2020   VideoChat  Facetime\par \par PCP: Dr. Asad Haywood\par             Gyn: Dr. Sara Roman\par            .\par            CC: Weight gain; acne\par \par Gradually increased phentermine 37 mg from 1/4 tab daily to 1/2 tab daily.    Has found it effective.\par BP being monitored and remains within normal range.\par She feels well.\par No side effects noted.\par \par Impression:  Has noted decreased appetite, good effect thus far.  Under monitoring.\par \par Plan:  Continue 1/2 tablet phentermine for now.\par ROV within 80 days (in person).  \par \par \par Apr 20, 2020\par \par This is a 21+ minute VideoChat encounter with an established patient which was initiated by the patient during a time scheduled for a visit and the patient is aware that this may be a billable encounter.  The patient has not seen a provider of my specialty (Endocrinology) within out group in the past 7 days and this encounter is not anticipated to result in a scheduled in-person visit within he next 7 days.\par The reason for this VideoChat encounter is listed below under "CC:"\par Verbal consent was discussed and obtained from the patient for this visit:  "You have chosen to receive care through the use of tele-media or telephone.   This enables health care providers at different locations to provide safe, effective, and convenient care through the use of technology.  Please note this is a billable encounter.  As with any health care service, there are risks associated with the use of tele-media or telephone, including equipment failure, poor image and/or resolution, and  issues.  You understand that I cannot physically examine you and that you may need to come to the office to complete the assessment.\par \par Patient agreed verbally to understanding the risks, benefits, alternatives of Tele-Media and telephone as explained.  All questions regarding tele-media and telephone encounters were answered.\par \par 60 yo healthcare .\par Her daughter, EMT, has been assigned to [employer] - checks her BP - most recently 138/76.\par Patient ran out of topirimate and phentermine.\par \par Plan:  Restart topirimate 25 mg in PM and phentermine 1/4 of a 37 mg tablet.\par Call me over the weekend after her daughter checks BP to consider increasing dose to 1/2 tab of phentermine.\par Schedule f/u appt for about 6-8 weeks - before she runs out of phentermine.\par \par \par \par Nov 19, 2019\par \par PCP: Dr. Asad Haywood\par             Gyn: Dr. Sara Roman\par            .\par            CC: Weight gain; acne\par \par Weight by me today w clothes and shoes:  168 lbs (room 3)\par Notes her appetite is decreased in AM but medication wears off.\par Would like to increase dose.  \par BP today 138/90 ("coffee and rushing" she explains)  \par BP in past was within normal limits.\par She reports her EMT daughter has been checking her BP and has been fine.\par Will not increase dose of phentermine at present, will add topirimate 25 mg in PM for now and then\par eventually BID.    After BP monitored by me as well as evaluation by PCP, can consider raising dose of\par phentermine.  \par \par Oct 25, 2019\par \par          PCP: Dr. Asad Haywood\par             Gyn: Dr. Sara Roman\par            .\par            CC: Weight gain; acne\par \par Here last Fall at which time labs all wnl.\par Had recent near syncopal episode at [facility] after donating blood.  \par            .\par I don't want to go on keto diet.  I don't eat meat.  My daughter is a paramedic, so she can check my blood pressure.\par \par Plan:  She would like phentermine.  Warned of potential side effects.\par \par \par Previous notes from [employer] appended below.\par \par   Sept 12, 2018\par            .\par            PCP: Dr. Lisa Alvarado\par             Gyn: Dr. 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 \par            Her mother sees me for bone density issues.\par            Available old records reviewed and not presented again here. \par            .\par            Mother of two (18 and 21).\par            .\par            5 ft 3 in tall; about 125 after high school.\par            Started gaining weight about two years ago. \par            Today 178 lbs.\par            Not certain of year of menopause as she was on BCP.\par            No hot flashes.\par            .\par            Impression: Reason for weight gain, acne not clear.\par            .\par            Plan: Will start evaluation by checking androgens, cortisol.\par            Possible 24 hour urine free cortisol in future as well as overnight dexamethasone suppression test. \par            .

## 2021-09-28 ENCOUNTER — APPOINTMENT (OUTPATIENT)
Dept: ENDOCRINOLOGY | Facility: CLINIC | Age: 61
End: 2021-09-28
Payer: COMMERCIAL

## 2021-09-28 PROCEDURE — 99214 OFFICE O/P EST MOD 30 MIN: CPT | Mod: 95
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
## 2021-11-19 ENCOUNTER — APPOINTMENT (OUTPATIENT)
Dept: ENDOCRINOLOGY | Facility: CLINIC | Age: 61
End: 2021-11-19
Payer: COMMERCIAL

## 2021-11-19 PROCEDURE — 99214 OFFICE O/P EST MOD 30 MIN: CPT | Mod: 95
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
## 2022-02-16 ENCOUNTER — APPOINTMENT (OUTPATIENT)
Dept: ENDOCRINOLOGY | Facility: CLINIC | Age: 62
End: 2022-02-16
Payer: COMMERCIAL

## 2022-02-16 PROCEDURE — 99214 OFFICE O/P EST MOD 30 MIN: CPT | Mod: 95
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
## 2022-02-17 NOTE — END OF VISIT
Strep positive - will treat with Amoxicillin for 10 days     Ibuprofen or Tylenol for fever or pain     Push fluids     Return to clinic if symptoms not improved or worsening   
[Time Spent: ___ minutes] : I have spent [unfilled] minutes of time on the encounter.

## 2022-03-22 ENCOUNTER — NON-APPOINTMENT (OUTPATIENT)
Age: 62
End: 2022-03-22

## 2022-03-22 ENCOUNTER — APPOINTMENT (OUTPATIENT)
Dept: GASTROENTEROLOGY | Facility: CLINIC | Age: 62
End: 2022-03-22
Payer: COMMERCIAL

## 2022-03-22 PROCEDURE — 99442: CPT

## 2022-03-22 NOTE — ASSESSMENT
[FreeTextEntry1] : 1.  \par \par \par \par \par \par AS WE OBTAIN INFORMED CONSENT FOR COLONOSCOPY, UPPER ENDOSCOPY [EGD], OR BOTH PROCEDURES TOGETHER;\par \par As with all procedures, there are risks of which the patient should be aware\par \par 1.  Anesthesia; deep sedation with Propofol;  there is a small risk of aspiration and pulmonary infection.  The Anesthesiologist meets with the patient the morning of the procedure to discuss in more detail\par \par 2.  risk of bleeding; from removal of a polyp, or rarely, from biopsies, 1 % or less\par \par 3.  risk of injury or perforation of the colon or upper GI tract; one in a thousand or less,  from removing a polyp or from advancing the instrument\par \par \par discussed with patient

## 2022-03-22 NOTE — HISTORY OF PRESENT ILLNESS
[FreeTextEntry1] : this is a telehealth visit, consent on file, audio and video, just patient an d i \par patient is at her office\par I am in my [place]\par \par primary reason for visit; to discuss colonoscopy\par father had colon cancer and perhaps lung cancer..\par \par had a colostomy\par brother;  polyps; mid sixties;  several polyps\par \par has prediabetes\par also on Phentermine because of this , and for attempt at weight loss\par \par \par surgical history...\par vein surgery\par no gyn surgery..\par \par \par

## 2022-05-10 ENCOUNTER — APPOINTMENT (OUTPATIENT)
Dept: INTERNAL MEDICINE | Facility: CLINIC | Age: 62
End: 2022-05-10

## 2022-05-10 ENCOUNTER — APPOINTMENT (OUTPATIENT)
Dept: ENDOCRINOLOGY | Facility: CLINIC | Age: 62
End: 2022-05-10

## 2022-05-19 ENCOUNTER — APPOINTMENT (OUTPATIENT)
Dept: GASTROENTEROLOGY | Facility: HOSPITAL | Age: 62
End: 2022-05-19

## 2023-03-20 ENCOUNTER — APPOINTMENT (OUTPATIENT)
Dept: ENDOCRINOLOGY | Facility: CLINIC | Age: 63
End: 2023-03-20
Payer: COMMERCIAL

## 2023-03-20 VITALS
BODY MASS INDEX: 27.31 KG/M2 | SYSTOLIC BLOOD PRESSURE: 116 MMHG | OXYGEN SATURATION: 98 % | WEIGHT: 160 LBS | HEIGHT: 64 IN | DIASTOLIC BLOOD PRESSURE: 74 MMHG | HEART RATE: 75 BPM

## 2023-03-20 DIAGNOSIS — R63.5 ABNORMAL WEIGHT GAIN: ICD-10-CM

## 2023-03-20 DIAGNOSIS — E53.8 DEFICIENCY OF OTHER SPECIFIED B GROUP VITAMINS: ICD-10-CM

## 2023-03-20 PROCEDURE — 99214 OFFICE O/P EST MOD 30 MIN: CPT

## 2023-03-21 LAB
25(OH)D3 SERPL-MCNC: 26.1 NG/ML
ALBUMIN SERPL ELPH-MCNC: 4.9 G/DL
ALP BLD-CCNC: 86 U/L
ALT SERPL-CCNC: 8 U/L
ANION GAP SERPL CALC-SCNC: 13 MMOL/L
AST SERPL-CCNC: 12 U/L
B BURGDOR AB SER-IMP: NEGATIVE
B BURGDOR IGG+IGM SER QL: 0.19 INDEX
BASOPHILS # BLD AUTO: 0.06 K/UL
BASOPHILS NFR BLD AUTO: 0.6 %
BILIRUB DIRECT SERPL-MCNC: 0.1 MG/DL
BILIRUB INDIRECT SERPL-MCNC: 0.3 MG/DL
BILIRUB SERPL-MCNC: 0.5 MG/DL
BUN SERPL-MCNC: 12 MG/DL
CALCIUM SERPL-MCNC: 9.8 MG/DL
CHLORIDE SERPL-SCNC: 103 MMOL/L
CO2 SERPL-SCNC: 27 MMOL/L
CORTIS SERPL-MCNC: 5.4 UG/DL
CREAT SERPL-MCNC: 0.85 MG/DL
EGFR: 78 ML/MIN/1.73M2
EOSINOPHIL # BLD AUTO: 0.05 K/UL
EOSINOPHIL NFR BLD AUTO: 0.5 %
ERYTHROCYTE [SEDIMENTATION RATE] IN BLOOD BY WESTERGREN METHOD: 21 MM/HR
ESTIMATED AVERAGE GLUCOSE: 108 MG/DL
GLUCOSE SERPL-MCNC: 92 MG/DL
HBA1C MFR BLD HPLC: 5.4 %
HCT VFR BLD CALC: 47.5 %
HGB BLD-MCNC: 15.2 G/DL
IMM GRANULOCYTES NFR BLD AUTO: 0.2 %
LYMPHOCYTES # BLD AUTO: 2.84 K/UL
LYMPHOCYTES NFR BLD AUTO: 30 %
MAN DIFF?: NORMAL
MCHC RBC-ENTMCNC: 30 PG
MCHC RBC-ENTMCNC: 32 GM/DL
MCV RBC AUTO: 93.7 FL
MONOCYTES # BLD AUTO: 0.69 K/UL
MONOCYTES NFR BLD AUTO: 7.3 %
NEUTROPHILS # BLD AUTO: 5.81 K/UL
NEUTROPHILS NFR BLD AUTO: 61.4 %
PLATELET # BLD AUTO: 270 K/UL
POTASSIUM SERPL-SCNC: 4.3 MMOL/L
PROT SERPL-MCNC: 7.2 G/DL
RBC # BLD: 5.07 M/UL
RBC # FLD: 14.3 %
RHEUMATOID FACT SER QL: <10 IU/ML
SODIUM SERPL-SCNC: 142 MMOL/L
T4 SERPL-MCNC: 7.6 UG/DL
TSH SERPL-ACNC: 1.82 UIU/ML
VIT B12 SERPL-MCNC: 451 PG/ML
WBC # FLD AUTO: 9.47 K/UL
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
## 2023-04-11 ENCOUNTER — APPOINTMENT (OUTPATIENT)
Dept: GASTROENTEROLOGY | Facility: CLINIC | Age: 63
End: 2023-04-11
Payer: COMMERCIAL

## 2023-04-11 DIAGNOSIS — Z80.0 FAMILY HISTORY OF MALIGNANT NEOPLASM OF DIGESTIVE ORGANS: ICD-10-CM

## 2023-04-11 DIAGNOSIS — Z12.11 ENCOUNTER FOR SCREENING FOR MALIGNANT NEOPLASM OF COLON: ICD-10-CM

## 2023-04-11 PROCEDURE — 99213 OFFICE O/P EST LOW 20 MIN: CPT | Mod: 95

## 2023-04-11 NOTE — ASSESSMENT
[FreeTextEntry1] : 1. the patieis due for a first colonoscopy probably in about seven years..we checked through [employer]...perhaps it was in office..\par \par actually it was about ten years ago..or more\par \par patient has no medical co morbidities..\par \par More than 50% of the face to face time was devoted to counseling and /or coordination of care.  THis coordination of care may have included reviewing other medical notes and reports, and communicating with other health professionals\par \par \par AS WE OBTAIN INFORMED CONSENT FOR COLONOSCOPY, UPPER ENDOSCOPY [EGD], OR BOTH PROCEDURES TOGETHER;\par \par As with all procedures, there are risks of which the patient should be aware\par \par 1.  Anesthesia; deep sedation with Propofol;  there is a small risk of aspiration and pulmonary infection.  The Anesthesiologist meets with the patient the morning of the procedure to discuss in more detail\par \par 2.  risk of bleeding; from removal of a polyp, or rarely, from biopsies, 1 % or less\par \par 3.  risk of injury or perforation of the colon or upper GI tract; one in a thousand or less,  from removing a polyp or from advancing the instrument\par \par discussion of best timing for her daughter's first colonoscopy, perhaps between 30 and 35, definitely by age 35\par \par

## 2023-04-11 NOTE — HISTORY OF PRESENT ILLNESS
[FreeTextEntry1] : patient is in good medical health\par \par this is a telehealth visit, just the two of us are on the line\par \par patient has no meds, no cv dx, no smoking hx, no ongoing medical issues\par \par no prior abdom or gyn surgeries\par \par she goes to dr Haywood for her medical care \par \par she has seen dr Hellerman for weight and metabolic regulation\par \par patient has a family hx of colon ca\par \par her Dad had colon ca, age about 50, succumbed at age 52\par and an older brother has had colon polyps\par \par and patient enquired about colon cancer for her daughter aged 26, because of her own fh, and her paternal grandmother was Ms Finch, who had ca colon...\par \par

## 2023-04-14 ENCOUNTER — APPOINTMENT (OUTPATIENT)
Dept: GASTROENTEROLOGY | Facility: HOSPITAL | Age: 63
End: 2023-04-14

## 2023-04-14 ENCOUNTER — TRANSCRIPTION ENCOUNTER (OUTPATIENT)
Age: 63
End: 2023-04-14

## 2023-05-21 ENCOUNTER — RX RENEWAL (OUTPATIENT)
Age: 63
End: 2023-05-21

## 2023-06-20 LAB
ACTH SER-ACNC: 10.6 PG/ML
CCP AB SER IA-ACNC: <8 UNITS
RF+CCP IGG SER-IMP: NEGATIVE

## 2023-07-10 ENCOUNTER — NON-APPOINTMENT (OUTPATIENT)
Age: 63
End: 2023-07-10

## 2023-07-10 ENCOUNTER — APPOINTMENT (OUTPATIENT)
Dept: INTERNAL MEDICINE | Facility: CLINIC | Age: 63
End: 2023-07-10
Payer: COMMERCIAL

## 2023-07-10 VITALS
DIASTOLIC BLOOD PRESSURE: 72 MMHG | HEART RATE: 88 BPM | SYSTOLIC BLOOD PRESSURE: 126 MMHG | HEIGHT: 64 IN | WEIGHT: 147 LBS | OXYGEN SATURATION: 99 % | BODY MASS INDEX: 25.1 KG/M2

## 2023-07-10 DIAGNOSIS — R73.03 PREDIABETES.: ICD-10-CM

## 2023-07-10 DIAGNOSIS — Z86.2 PERSONAL HISTORY OF DISEASES OF THE BLOOD AND BLOOD-FORMING ORGANS AND CERTAIN DISORDERS INVOLVING THE IMMUNE MECHANISM: ICD-10-CM

## 2023-07-10 DIAGNOSIS — Z86.19 PERSONAL HISTORY OF OTHER INFECTIOUS AND PARASITIC DISEASES: ICD-10-CM

## 2023-07-10 DIAGNOSIS — Z87.39 PERSONAL HISTORY OF OTHER DISEASES OF THE MUSCULOSKELETAL SYSTEM AND CONNECTIVE TISSUE: ICD-10-CM

## 2023-07-10 DIAGNOSIS — Z13.820 ENCOUNTER FOR SCREENING FOR OSTEOPOROSIS: ICD-10-CM

## 2023-07-10 DIAGNOSIS — Z23 ENCOUNTER FOR IMMUNIZATION: ICD-10-CM

## 2023-07-10 DIAGNOSIS — R73.09 OTHER ABNORMAL GLUCOSE: ICD-10-CM

## 2023-07-10 DIAGNOSIS — Z12.31 ENCOUNTER FOR SCREENING MAMMOGRAM FOR MALIGNANT NEOPLASM OF BREAST: ICD-10-CM

## 2023-07-10 DIAGNOSIS — Z00.00 ENCOUNTER FOR GENERAL ADULT MEDICAL EXAMINATION W/OUT ABNORMAL FINDINGS: ICD-10-CM

## 2023-07-10 DIAGNOSIS — E11.9 TYPE 2 DIABETES MELLITUS W/OUT COMPLICATIONS: ICD-10-CM

## 2023-07-10 DIAGNOSIS — E55.9 VITAMIN D DEFICIENCY, UNSPECIFIED: ICD-10-CM

## 2023-07-10 PROCEDURE — 99386 PREV VISIT NEW AGE 40-64: CPT | Mod: 25

## 2023-07-10 PROCEDURE — 93000 ELECTROCARDIOGRAM COMPLETE: CPT

## 2023-07-10 PROCEDURE — 90715 TDAP VACCINE 7 YRS/> IM: CPT

## 2023-07-10 PROCEDURE — 90471 IMMUNIZATION ADMIN: CPT

## 2023-07-10 PROCEDURE — 36415 COLL VENOUS BLD VENIPUNCTURE: CPT

## 2023-07-10 RX ORDER — SODIUM PICOSULFATE, MAGNESIUM OXIDE, AND ANHYDROUS CITRIC ACID 10; 3.5; 12 MG/160ML; G/160ML; G/160ML
10-3.5-12 MG-GM LIQUID ORAL
Qty: 2 | Refills: 1 | Status: DISCONTINUED | COMMUNITY
Start: 2023-04-11 | End: 2023-07-10

## 2023-07-10 RX ORDER — ZOSTER VACCINE RECOMBINANT, ADJUVANTED 50 MCG/0.5
50 KIT INTRAMUSCULAR
Qty: 1 | Refills: 1 | Status: DISCONTINUED | COMMUNITY
Start: 2019-12-18 | End: 2023-07-10

## 2023-07-10 NOTE — HISTORY OF PRESENT ILLNESS
[FreeTextEntry1] : Physical  [de-identified] : Pt here for annual. Has not been here since 2019. She does no formal exercise but is very active throughout the day.  She has no particular concerns today. She needs a referral for her screening breast imaging.

## 2023-07-10 NOTE — HEALTH RISK ASSESSMENT
[Good] : ~his/her~ current health as good [Very Good] : ~his/her~  mood as very good [No] : In the past 12 months have you used drugs other than those required for medical reasons? No [No falls in past year] : Patient reported no falls in the past year [0] : 2) Feeling down, depressed, or hopeless: Not at all (0) [HIV Test offered] : HIV Test offered [Hepatitis C test offered] : Hepatitis C test offered [Former] : Former [Patient reported colonoscopy was normal] : Patient reported colonoscopy was normal [None] : None [With Significant Other] : lives with significant other [Employed] : employed [Single] : single [# Of Children ___] : has [unfilled] children [Feels Safe at Home] : Feels safe at home [Fully functional (bathing, dressing, toileting, transferring, walking, feeding)] : Fully functional (bathing, dressing, toileting, transferring, walking, feeding) [Fully functional (using the telephone, shopping, preparing meals, housekeeping, doing laundry, using] : Fully functional and needs no help or supervision to perform IADLs (using the telephone, shopping, preparing meals, housekeeping, doing laundry, using transportation, managing medications and managing finances) [FreeTextEntry1] : none [de-identified] : some exercise  [de-identified] : regular diet  [[id number]] : 0 [MammogramComments] : referring [PapSmearComments] : sees Dr radha Roman [ColonoscopyDate] : 04/23 [ColonoscopyComments] : Dr Maher, needs in 5 years [FreeTextEntry2] : [employer]  [FreeTextEntry3] : son and daughter

## 2023-07-11 ENCOUNTER — TRANSCRIPTION ENCOUNTER (OUTPATIENT)
Age: 63
End: 2023-07-11

## 2023-07-11 LAB
ALBUMIN SERPL ELPH-MCNC: 4.8 G/DL
ALP BLD-CCNC: 77 U/L
ALT SERPL-CCNC: 9 U/L
ANION GAP SERPL CALC-SCNC: 15 MMOL/L
AST SERPL-CCNC: 19 U/L
BILIRUB SERPL-MCNC: 0.5 MG/DL
BUN SERPL-MCNC: 16 MG/DL
CALCIUM SERPL-MCNC: 9.6 MG/DL
CHLORIDE SERPL-SCNC: 103 MMOL/L
CHOLEST SERPL-MCNC: 205 MG/DL
CO2 SERPL-SCNC: 22 MMOL/L
CREAT SERPL-MCNC: 0.83 MG/DL
EGFR: 81 ML/MIN/1.73M2
ESTIMATED AVERAGE GLUCOSE: 103 MG/DL
GLUCOSE SERPL-MCNC: 88 MG/DL
HBA1C MFR BLD HPLC: 5.2 %
HCT VFR BLD CALC: 46 %
HCV AB SER QL: NONREACTIVE
HCV S/CO RATIO: 0.07 S/CO
HDLC SERPL-MCNC: 81 MG/DL
HGB BLD-MCNC: 14.2 G/DL
HIV1+2 AB SPEC QL IA.RAPID: NONREACTIVE
LDLC SERPL CALC-MCNC: 107 MG/DL
MCHC RBC-ENTMCNC: 30 PG
MCHC RBC-ENTMCNC: 30.9 GM/DL
MCV RBC AUTO: 97 FL
NONHDLC SERPL-MCNC: 124 MG/DL
PLATELET # BLD AUTO: 251 K/UL
POTASSIUM SERPL-SCNC: 3.9 MMOL/L
PROT SERPL-MCNC: 6.8 G/DL
RBC # BLD: 4.74 M/UL
RBC # FLD: 14.6 %
SODIUM SERPL-SCNC: 141 MMOL/L
TRIGL SERPL-MCNC: 92 MG/DL
TSH SERPL-ACNC: 2.11 UIU/ML
WBC # FLD AUTO: 8.65 K/UL

## 2023-08-11 ENCOUNTER — APPOINTMENT (OUTPATIENT)
Dept: ENDOCRINOLOGY | Facility: CLINIC | Age: 63
End: 2023-08-11
Payer: COMMERCIAL

## 2023-08-11 PROCEDURE — 99214 OFFICE O/P EST MOD 30 MIN: CPT | Mod: 95
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
## 2023-10-24 ENCOUNTER — APPOINTMENT (OUTPATIENT)
Dept: ENDOCRINOLOGY | Facility: CLINIC | Age: 63
End: 2023-10-24

## 2024-01-12 ENCOUNTER — RX RENEWAL (OUTPATIENT)
Age: 64
End: 2024-01-12

## 2024-05-10 ENCOUNTER — APPOINTMENT (OUTPATIENT)
Dept: INTERNAL MEDICINE | Facility: CLINIC | Age: 64
End: 2024-05-10
Payer: COMMERCIAL

## 2024-05-10 VITALS
OXYGEN SATURATION: 98 % | HEART RATE: 72 BPM | SYSTOLIC BLOOD PRESSURE: 132 MMHG | WEIGHT: 156 LBS | BODY MASS INDEX: 26.78 KG/M2 | TEMPERATURE: 98.5 F | DIASTOLIC BLOOD PRESSURE: 84 MMHG | RESPIRATION RATE: 16 BRPM

## 2024-05-10 DIAGNOSIS — R92.30 DENSE BREASTS, UNSPECIFIED: ICD-10-CM

## 2024-05-10 DIAGNOSIS — R09.82 POSTNASAL DRIP: ICD-10-CM

## 2024-05-10 DIAGNOSIS — Z12.39 ENCOUNTER FOR OTHER SCREENING FOR MALIGNANT NEOPLASM OF BREAST: ICD-10-CM

## 2024-05-10 PROCEDURE — 99213 OFFICE O/P EST LOW 20 MIN: CPT

## 2024-05-10 RX ORDER — FLUTICASONE PROPIONATE 50 UG/1
50 SPRAY, METERED NASAL
Qty: 1 | Refills: 1 | Status: ACTIVE | COMMUNITY
Start: 2024-05-10 | End: 1900-01-01

## 2024-05-10 NOTE — HISTORY OF PRESENT ILLNESS
[FreeTextEntry1] : for follow up [de-identified] : 63 year female came for follow up during coverage PCP Pt has few medical issues: 1 c/o flare up of postnasal drip  2 asking for annual breast cancer screening  Currently pt feels well, denies fever, headache, chest pain, palpitations, shortness of breath.

## 2024-05-17 ENCOUNTER — APPOINTMENT (OUTPATIENT)
Dept: ENDOCRINOLOGY | Facility: CLINIC | Age: 64
End: 2024-05-17
Payer: COMMERCIAL

## 2024-05-17 VITALS
SYSTOLIC BLOOD PRESSURE: 120 MMHG | HEART RATE: 80 BPM | DIASTOLIC BLOOD PRESSURE: 72 MMHG | BODY MASS INDEX: 26.66 KG/M2 | HEIGHT: 64 IN | WEIGHT: 156.13 LBS | OXYGEN SATURATION: 99 %

## 2024-05-17 DIAGNOSIS — E78.00 PURE HYPERCHOLESTEROLEMIA, UNSPECIFIED: ICD-10-CM

## 2024-05-17 DIAGNOSIS — E66.9 OBESITY, UNSPECIFIED: ICD-10-CM

## 2024-05-17 DIAGNOSIS — R79.89 OTHER SPECIFIED ABNORMAL FINDINGS OF BLOOD CHEMISTRY: ICD-10-CM

## 2024-05-17 LAB
ALBUMIN SERPL ELPH-MCNC: 4.6 G/DL
ALP BLD-CCNC: 85 U/L
ALT SERPL-CCNC: 10 U/L
ANION GAP SERPL CALC-SCNC: 12 MMOL/L
AST SERPL-CCNC: 13 U/L
BASOPHILS # BLD AUTO: 0.07 K/UL
BASOPHILS NFR BLD AUTO: 1 %
BILIRUB DIRECT SERPL-MCNC: 0.2 MG/DL
BILIRUB INDIRECT SERPL-MCNC: 0.4 MG/DL
BILIRUB SERPL-MCNC: 0.6 MG/DL
BUN SERPL-MCNC: 15 MG/DL
CALCIUM SERPL-MCNC: 9.6 MG/DL
CHLORIDE SERPL-SCNC: 102 MMOL/L
CHOLEST SERPL-MCNC: 230 MG/DL
CO2 SERPL-SCNC: 27 MMOL/L
CREAT SERPL-MCNC: 0.89 MG/DL
EGFR: 74 ML/MIN/1.73M2
EOSINOPHIL # BLD AUTO: 0.11 K/UL
EOSINOPHIL NFR BLD AUTO: 1.5 %
GLUCOSE SERPL-MCNC: 85 MG/DL
HCT VFR BLD CALC: 46 %
HDLC SERPL-MCNC: 95 MG/DL
HGB BLD-MCNC: 14.6 G/DL
IMM GRANULOCYTES NFR BLD AUTO: 0.3 %
LDLC SERPL CALC-MCNC: 125 MG/DL
LYMPHOCYTES # BLD AUTO: 2.6 K/UL
LYMPHOCYTES NFR BLD AUTO: 35.9 %
MAN DIFF?: NORMAL
MCHC RBC-ENTMCNC: 29 PG
MCHC RBC-ENTMCNC: 31.7 GM/DL
MCV RBC AUTO: 91.5 FL
MONOCYTES # BLD AUTO: 0.61 K/UL
MONOCYTES NFR BLD AUTO: 8.4 %
NEUTROPHILS # BLD AUTO: 3.84 K/UL
NEUTROPHILS NFR BLD AUTO: 52.9 %
NONHDLC SERPL-MCNC: 135 MG/DL
PLATELET # BLD AUTO: 269 K/UL
POTASSIUM SERPL-SCNC: 4.4 MMOL/L
PROT SERPL-MCNC: 6.7 G/DL
RBC # BLD: 5.03 M/UL
RBC # FLD: 14.7 %
SODIUM SERPL-SCNC: 141 MMOL/L
TRIGL SERPL-MCNC: 59 MG/DL
TSH SERPL-ACNC: 1.64 UIU/ML
VIT B12 SERPL-MCNC: 414 PG/ML
WBC # FLD AUTO: 7.25 K/UL

## 2024-05-17 PROCEDURE — 99214 OFFICE O/P EST MOD 30 MIN: CPT | Mod: 25

## 2024-05-17 PROCEDURE — 36415 COLL VENOUS BLD VENIPUNCTURE: CPT

## 2024-05-17 RX ORDER — TOPIRAMATE 25 MG/1
25 TABLET, FILM COATED ORAL DAILY
Qty: 30 | Refills: 11 | Status: ACTIVE | COMMUNITY
Start: 2019-11-19 | End: 1900-01-01

## 2024-05-17 RX ORDER — PHENTERMINE HYDROCHLORIDE 37.5 MG/1
37.5 TABLET ORAL
Qty: 45 | Refills: 0 | Status: ACTIVE | COMMUNITY
Start: 2019-10-25 | End: 1900-01-01
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
## 2024-05-20 ENCOUNTER — TRANSCRIPTION ENCOUNTER (OUTPATIENT)
Age: 64
End: 2024-05-20

## 2024-06-12 ENCOUNTER — APPOINTMENT (OUTPATIENT)
Dept: BARIATRICS/WEIGHT MGMT | Facility: CLINIC | Age: 64
End: 2024-06-12
Payer: COMMERCIAL

## 2024-06-12 VITALS
WEIGHT: 147 LBS | BODY MASS INDEX: 25.1 KG/M2 | SYSTOLIC BLOOD PRESSURE: 130 MMHG | DIASTOLIC BLOOD PRESSURE: 72 MMHG | HEIGHT: 64 IN

## 2024-06-12 DIAGNOSIS — K21.9 GASTRO-ESOPHAGEAL REFLUX DISEASE W/OUT ESOPHAGITIS: ICD-10-CM

## 2024-06-12 DIAGNOSIS — E66.3 OVERWEIGHT: ICD-10-CM

## 2024-06-12 PROCEDURE — 99204 OFFICE O/P NEW MOD 45 MIN: CPT

## 2024-06-12 PROCEDURE — G2211 COMPLEX E/M VISIT ADD ON: CPT | Mod: NC,1L

## 2024-06-12 RX ORDER — FAMOTIDINE 20 MG/1
20 TABLET, FILM COATED ORAL
Qty: 30 | Refills: 0 | Status: ACTIVE | COMMUNITY
Start: 2024-06-12

## 2024-06-12 NOTE — HISTORY OF PRESENT ILLNESS
[Improved Health] : Improved health [Quality of Life] : Quality of life [FreeTextEntry1] : Medical Hx: GERD, anxiety  Surgical Hx: Corrected RAMON surgically   Started struggling with weight 10 yrs  Past Wt Loss Attempts: Lifestyle Medications: Phentermine 37.5 mg daily currently; Takes 1/4 of the tab  Previously on Topiramate 25 mg daily Highest Adult Wt: 150s Lowest Adult Wt: 130s   Food Recall: B-Protein drink  L-Lunch from home 12-1pm; a salad with protein Sweet available in the office D-May skip and have ice cream S-Carrots  Lots of sweets cravings; much decreased since re-starting Phentermine.  Loss of control with eating: Yes, stress eats    Exercise Regimen: No formal routine; Gardening and home repair    Sleep: Poor; light sleeper  Stress Level: High (new cat, other life stressors); breathing exercises  Gyn: post-jon Social Hx: New cat 2 cats, 2 dogs  Daughter works nights

## 2024-06-27 ENCOUNTER — APPOINTMENT (OUTPATIENT)
Dept: INTERNAL MEDICINE | Facility: CLINIC | Age: 64
End: 2024-06-27
Payer: COMMERCIAL

## 2024-06-27 VITALS
WEIGHT: 143 LBS | OXYGEN SATURATION: 99 % | BODY MASS INDEX: 24.41 KG/M2 | HEIGHT: 64 IN | SYSTOLIC BLOOD PRESSURE: 144 MMHG | HEART RATE: 98 BPM | DIASTOLIC BLOOD PRESSURE: 82 MMHG

## 2024-06-27 DIAGNOSIS — G47.00 INSOMNIA, UNSPECIFIED: ICD-10-CM

## 2024-06-27 DIAGNOSIS — R03.0 ELEVATED BLOOD-PRESSURE READING, W/OUT DIAGNOSIS OF HYPERTENSION: ICD-10-CM

## 2024-06-27 PROCEDURE — 99213 OFFICE O/P EST LOW 20 MIN: CPT

## 2024-07-26 ENCOUNTER — APPOINTMENT (OUTPATIENT)
Dept: INTERNAL MEDICINE | Facility: CLINIC | Age: 64
End: 2024-07-26

## 2024-08-15 ENCOUNTER — APPOINTMENT (OUTPATIENT)
Dept: BARIATRICS/WEIGHT MGMT | Facility: CLINIC | Age: 64
End: 2024-08-15
Payer: COMMERCIAL

## 2024-08-15 DIAGNOSIS — E66.3 OVERWEIGHT: ICD-10-CM

## 2024-08-15 PROCEDURE — G2211 COMPLEX E/M VISIT ADD ON: CPT | Mod: NC

## 2024-08-15 PROCEDURE — 99441: CPT

## 2024-08-15 NOTE — HISTORY OF PRESENT ILLNESS
[FreeTextEntry1] : Medical Hx: GERD, anxiety  Surgical Hx: Corrected RAMON surgically   Started struggling with weight 10 yrs  Past Wt Loss Attempts: Lifestyle Medications: Phentermine 37.5 mg daily currently; Takes 1/4 of the tab  Previously on Topiramate 25 mg daily Highest Adult Wt: 150s Lowest Adult Wt: 130s   Food Recall: B-Protein drink  L-Lunch from home 12-1pm; a salad with protein Sweet available in the office D-May skip and have ice cream S-Carrots  Lots of sweets cravings; much decreased since re-starting Phentermine.  Loss of control with eating: Yes, stress eats    Exercise Regimen: No formal routine; Gardening and home repair    Sleep: Poor; light sleeper  Stress Level: High (new cat, other life stressors); breathing exercises  Gyn: post-jon Social Hx: New cat 2 cats, 2 dogs  Daughter works nights   8/15/24: This visit was provided via telehealth using real-time12-way audio visual technology. The patient, Terejustin Cruz, was located at home at the time of the visit. The provider, TALYA ROY, was located in [place] at the time of the visit. The patient and provider participated in the telehealth encounter. Patient consented to all.  Feels well. On Phentermine 37.5 mg daily currently (takes 1/2 tab) and Topiramate 25 mg daily. Less sugar craving, eating less carbs/starches. +fruit and veggies. Protein drink in the morning.

## 2024-12-19 ENCOUNTER — APPOINTMENT (OUTPATIENT)
Dept: INTERNAL MEDICINE | Facility: CLINIC | Age: 64
End: 2024-12-19
Payer: COMMERCIAL

## 2024-12-19 ENCOUNTER — NON-APPOINTMENT (OUTPATIENT)
Age: 64
End: 2024-12-19

## 2024-12-19 VITALS
HEIGHT: 64 IN | HEART RATE: 90 BPM | DIASTOLIC BLOOD PRESSURE: 74 MMHG | BODY MASS INDEX: 23.73 KG/M2 | SYSTOLIC BLOOD PRESSURE: 146 MMHG | TEMPERATURE: 97.2 F | WEIGHT: 139 LBS | RESPIRATION RATE: 16 BRPM | OXYGEN SATURATION: 99 %

## 2024-12-19 DIAGNOSIS — M72.2 PLANTAR FASCIAL FIBROMATOSIS: ICD-10-CM

## 2024-12-19 DIAGNOSIS — Z12.11 ENCOUNTER FOR SCREENING FOR MALIGNANT NEOPLASM OF COLON: ICD-10-CM

## 2024-12-19 DIAGNOSIS — Z87.898 PERSONAL HISTORY OF OTHER SPECIFIED CONDITIONS: ICD-10-CM

## 2024-12-19 DIAGNOSIS — K04.8 RADICULAR CYST: ICD-10-CM

## 2024-12-19 DIAGNOSIS — Z00.00 ENCOUNTER FOR GENERAL ADULT MEDICAL EXAMINATION W/OUT ABNORMAL FINDINGS: ICD-10-CM

## 2024-12-19 DIAGNOSIS — E78.00 PURE HYPERCHOLESTEROLEMIA, UNSPECIFIED: ICD-10-CM

## 2024-12-19 DIAGNOSIS — Z86.39 PERSONAL HISTORY OF OTHER ENDOCRINE, NUTRITIONAL AND METABOLIC DISEASE: ICD-10-CM

## 2024-12-19 DIAGNOSIS — R79.89 OTHER SPECIFIED ABNORMAL FINDINGS OF BLOOD CHEMISTRY: ICD-10-CM

## 2024-12-19 DIAGNOSIS — Z12.31 ENCOUNTER FOR SCREENING MAMMOGRAM FOR MALIGNANT NEOPLASM OF BREAST: ICD-10-CM

## 2024-12-19 DIAGNOSIS — Z12.39 ENCOUNTER FOR OTHER SCREENING FOR MALIGNANT NEOPLASM OF BREAST: ICD-10-CM

## 2024-12-19 DIAGNOSIS — R03.0 ELEVATED BLOOD-PRESSURE READING, W/OUT DIAGNOSIS OF HYPERTENSION: ICD-10-CM

## 2024-12-19 DIAGNOSIS — R92.30 DENSE BREASTS, UNSPECIFIED: ICD-10-CM

## 2024-12-19 DIAGNOSIS — Z13.820 ENCOUNTER FOR SCREENING FOR OSTEOPOROSIS: ICD-10-CM

## 2024-12-19 DIAGNOSIS — K21.9 GASTRO-ESOPHAGEAL REFLUX DISEASE W/OUT ESOPHAGITIS: ICD-10-CM

## 2024-12-19 DIAGNOSIS — E53.8 DEFICIENCY OF OTHER SPECIFIED B GROUP VITAMINS: ICD-10-CM

## 2024-12-19 DIAGNOSIS — E66.3 OVERWEIGHT: ICD-10-CM

## 2024-12-19 DIAGNOSIS — E66.811 OBESITY, CLASS 1: ICD-10-CM

## 2024-12-19 PROCEDURE — 93000 ELECTROCARDIOGRAM COMPLETE: CPT

## 2024-12-19 PROCEDURE — 36415 COLL VENOUS BLD VENIPUNCTURE: CPT

## 2024-12-19 PROCEDURE — 99213 OFFICE O/P EST LOW 20 MIN: CPT | Mod: 25

## 2024-12-19 PROCEDURE — 99396 PREV VISIT EST AGE 40-64: CPT

## 2024-12-19 RX ORDER — AMOXICILLIN 500 MG/1
500 CAPSULE ORAL
Qty: 42 | Refills: 0 | Status: ACTIVE | COMMUNITY
Start: 2024-12-19 | End: 1900-01-01

## 2024-12-19 RX ORDER — METRONIDAZOLE 500 MG/1
500 TABLET ORAL
Qty: 42 | Refills: 0 | Status: ACTIVE | COMMUNITY
Start: 2024-12-19 | End: 1900-01-01

## 2024-12-20 ENCOUNTER — TRANSCRIPTION ENCOUNTER (OUTPATIENT)
Age: 64
End: 2024-12-20

## 2024-12-20 LAB
ALBUMIN SERPL ELPH-MCNC: 4.4 G/DL
ALP BLD-CCNC: 94 U/L
ALT SERPL-CCNC: 7 U/L
ANION GAP SERPL CALC-SCNC: 13 MMOL/L
AST SERPL-CCNC: 12 U/L
BILIRUB SERPL-MCNC: 0.5 MG/DL
BUN SERPL-MCNC: 16 MG/DL
CALCIUM SERPL-MCNC: 9.5 MG/DL
CHLORIDE SERPL-SCNC: 107 MMOL/L
CHOLEST SERPL-MCNC: 203 MG/DL
CO2 SERPL-SCNC: 23 MMOL/L
CREAT SERPL-MCNC: 0.79 MG/DL
EGFR: 85 ML/MIN/1.73M2
ESTIMATED AVERAGE GLUCOSE: 105 MG/DL
GLUCOSE SERPL-MCNC: 90 MG/DL
HBA1C MFR BLD HPLC: 5.3 %
HCT VFR BLD CALC: 45.4 %
HDLC SERPL-MCNC: 79 MG/DL
HGB BLD-MCNC: 14.7 G/DL
LDLC SERPL CALC-MCNC: 112 MG/DL
MCHC RBC-ENTMCNC: 30.1 PG
MCHC RBC-ENTMCNC: 32.4 G/DL
MCV RBC AUTO: 92.8 FL
NONHDLC SERPL-MCNC: 124 MG/DL
PLATELET # BLD AUTO: 276 K/UL
POTASSIUM SERPL-SCNC: 4.1 MMOL/L
PROT SERPL-MCNC: 6.8 G/DL
RBC # BLD: 4.89 M/UL
RBC # FLD: 13.7 %
SODIUM SERPL-SCNC: 142 MMOL/L
TRIGL SERPL-MCNC: 69 MG/DL
TSH SERPL-ACNC: 1.63 UIU/ML
WBC # FLD AUTO: 9.57 K/UL

## 2024-12-21 ENCOUNTER — TRANSCRIPTION ENCOUNTER (OUTPATIENT)
Age: 64
End: 2024-12-21

## 2025-01-03 ENCOUNTER — TRANSCRIPTION ENCOUNTER (OUTPATIENT)
Age: 65
End: 2025-01-03

## 2025-01-14 DIAGNOSIS — M25.511 PAIN IN RIGHT SHOULDER: ICD-10-CM

## 2025-01-15 ENCOUNTER — APPOINTMENT (OUTPATIENT)
Dept: ENDOCRINOLOGY | Facility: CLINIC | Age: 65
End: 2025-01-15

## 2025-01-15 DIAGNOSIS — E66.3 OVERWEIGHT: ICD-10-CM

## 2025-01-15 PROCEDURE — 99214 OFFICE O/P EST MOD 30 MIN: CPT | Mod: 95

## 2025-01-17 ENCOUNTER — RESULT REVIEW (OUTPATIENT)
Age: 65
End: 2025-01-17

## 2025-01-17 ENCOUNTER — NON-APPOINTMENT (OUTPATIENT)
Age: 65
End: 2025-01-17

## 2025-01-17 ENCOUNTER — APPOINTMENT (OUTPATIENT)
Facility: CLINIC | Age: 65
End: 2025-01-17
Payer: COMMERCIAL

## 2025-01-17 VITALS
OXYGEN SATURATION: 98 % | HEART RATE: 85 BPM | WEIGHT: 139 LBS | SYSTOLIC BLOOD PRESSURE: 140 MMHG | HEIGHT: 64 IN | DIASTOLIC BLOOD PRESSURE: 95 MMHG | BODY MASS INDEX: 23.73 KG/M2 | TEMPERATURE: 98 F

## 2025-01-17 DIAGNOSIS — M25.811 OTHER SPECIFIED JOINT DISORDERS, RIGHT SHOULDER: ICD-10-CM

## 2025-01-17 DIAGNOSIS — M75.21 BICIPITAL TENDINITIS, RIGHT SHOULDER: ICD-10-CM

## 2025-01-17 DIAGNOSIS — S43.421A SPRAIN OF RIGHT ROTATOR CUFF CAPSULE, INITIAL ENCOUNTER: ICD-10-CM

## 2025-01-17 PROCEDURE — G2211 COMPLEX E/M VISIT ADD ON: CPT | Mod: NC

## 2025-01-17 PROCEDURE — 99204 OFFICE O/P NEW MOD 45 MIN: CPT | Mod: 25

## 2025-01-17 PROCEDURE — 20610 DRAIN/INJ JOINT/BURSA W/O US: CPT | Mod: RT

## 2025-02-24 ENCOUNTER — APPOINTMENT (OUTPATIENT)
Dept: ORTHOPEDIC SURGERY | Facility: CLINIC | Age: 65
End: 2025-02-24
Payer: COMMERCIAL

## 2025-02-24 VITALS
TEMPERATURE: 98.3 F | BODY MASS INDEX: 22.46 KG/M2 | OXYGEN SATURATION: 97 % | RESPIRATION RATE: 18 BRPM | HEART RATE: 84 BPM | HEIGHT: 64 IN | WEIGHT: 131.56 LBS | DIASTOLIC BLOOD PRESSURE: 83 MMHG | SYSTOLIC BLOOD PRESSURE: 153 MMHG

## 2025-02-24 DIAGNOSIS — M25.811 OTHER SPECIFIED JOINT DISORDERS, RIGHT SHOULDER: ICD-10-CM

## 2025-02-24 DIAGNOSIS — S43.421A SPRAIN OF RIGHT ROTATOR CUFF CAPSULE, INITIAL ENCOUNTER: ICD-10-CM

## 2025-02-24 DIAGNOSIS — M75.21 BICIPITAL TENDINITIS, RIGHT SHOULDER: ICD-10-CM

## 2025-02-24 PROCEDURE — 99213 OFFICE O/P EST LOW 20 MIN: CPT

## 2025-04-07 ENCOUNTER — APPOINTMENT (OUTPATIENT)
Dept: CARDIOLOGY | Facility: CLINIC | Age: 65
End: 2025-04-07
Payer: COMMERCIAL

## 2025-04-07 ENCOUNTER — APPOINTMENT (OUTPATIENT)
Dept: INTERNAL MEDICINE | Facility: CLINIC | Age: 65
End: 2025-04-07

## 2025-04-07 VITALS
DIASTOLIC BLOOD PRESSURE: 104 MMHG | SYSTOLIC BLOOD PRESSURE: 190 MMHG | OXYGEN SATURATION: 99 % | BODY MASS INDEX: 20.94 KG/M2 | TEMPERATURE: 98.2 F | RESPIRATION RATE: 20 BRPM | WEIGHT: 122 LBS | HEART RATE: 98 BPM

## 2025-04-07 DIAGNOSIS — E78.00 PURE HYPERCHOLESTEROLEMIA, UNSPECIFIED: ICD-10-CM

## 2025-04-07 DIAGNOSIS — Z72.3 LACK OF PHYSICAL EXERCISE: ICD-10-CM

## 2025-04-07 DIAGNOSIS — M25.811 OTHER SPECIFIED JOINT DISORDERS, RIGHT SHOULDER: ICD-10-CM

## 2025-04-07 PROCEDURE — 93000 ELECTROCARDIOGRAM COMPLETE: CPT

## 2025-04-07 PROCEDURE — 99203 OFFICE O/P NEW LOW 30 MIN: CPT

## 2025-04-08 ENCOUNTER — APPOINTMENT (OUTPATIENT)
Dept: INTERNAL MEDICINE | Facility: CLINIC | Age: 65
End: 2025-04-08
Payer: COMMERCIAL

## 2025-04-08 VITALS
DIASTOLIC BLOOD PRESSURE: 88 MMHG | TEMPERATURE: 98.9 F | SYSTOLIC BLOOD PRESSURE: 144 MMHG | OXYGEN SATURATION: 98 % | HEART RATE: 102 BPM

## 2025-04-08 PROCEDURE — 99213 OFFICE O/P EST LOW 20 MIN: CPT

## 2025-04-11 ENCOUNTER — APPOINTMENT (OUTPATIENT)
Dept: INTERNAL MEDICINE | Facility: CLINIC | Age: 65
End: 2025-04-11
Payer: COMMERCIAL

## 2025-04-11 VITALS
RESPIRATION RATE: 16 BRPM | SYSTOLIC BLOOD PRESSURE: 178 MMHG | TEMPERATURE: 97.6 F | HEIGHT: 64 IN | OXYGEN SATURATION: 98 % | WEIGHT: 122 LBS | HEART RATE: 88 BPM | BODY MASS INDEX: 20.83 KG/M2 | DIASTOLIC BLOOD PRESSURE: 90 MMHG

## 2025-04-11 DIAGNOSIS — I10 ESSENTIAL (PRIMARY) HYPERTENSION: ICD-10-CM

## 2025-04-11 DIAGNOSIS — K04.8 RADICULAR CYST: ICD-10-CM

## 2025-04-11 DIAGNOSIS — S99.921A UNSPECIFIED INJURY OF RIGHT FOOT, INITIAL ENCOUNTER: ICD-10-CM

## 2025-04-11 DIAGNOSIS — M25.511 PAIN IN RIGHT SHOULDER: ICD-10-CM

## 2025-04-11 PROCEDURE — 99215 OFFICE O/P EST HI 40 MIN: CPT

## 2025-04-11 PROCEDURE — 36415 COLL VENOUS BLD VENIPUNCTURE: CPT

## 2025-04-11 RX ORDER — VALACYCLOVIR 500 MG/1
500 TABLET, FILM COATED ORAL
Qty: 8 | Refills: 0 | Status: ACTIVE | COMMUNITY
Start: 2025-01-09

## 2025-04-13 LAB
ALBUMIN SERPL ELPH-MCNC: 4.7 G/DL
ALP BLD-CCNC: 77 U/L
ALT SERPL-CCNC: 16 U/L
ANION GAP SERPL CALC-SCNC: 15 MMOL/L
AST SERPL-CCNC: 17 U/L
BASOPHILS # BLD AUTO: 0.05 K/UL
BASOPHILS NFR BLD AUTO: 0.5 %
BILIRUB SERPL-MCNC: 0.6 MG/DL
BUN SERPL-MCNC: 13 MG/DL
CALCIUM SERPL-MCNC: 9.5 MG/DL
CHLORIDE SERPL-SCNC: 104 MMOL/L
CO2 SERPL-SCNC: 23 MMOL/L
CREAT SERPL-MCNC: 0.77 MG/DL
EGFRCR SERPLBLD CKD-EPI 2021: 87 ML/MIN/1.73M2
EOSINOPHIL # BLD AUTO: 0.03 K/UL
EOSINOPHIL NFR BLD AUTO: 0.3 %
GLUCOSE SERPL-MCNC: 106 MG/DL
HCT VFR BLD CALC: 46.2 %
HGB BLD-MCNC: 15 G/DL
IMM GRANULOCYTES NFR BLD AUTO: 0.3 %
LYMPHOCYTES # BLD AUTO: 2.79 K/UL
LYMPHOCYTES NFR BLD AUTO: 30.5 %
MAN DIFF?: NORMAL
MCHC RBC-ENTMCNC: 29.7 PG
MCHC RBC-ENTMCNC: 32.5 G/DL
MCV RBC AUTO: 91.5 FL
MONOCYTES # BLD AUTO: 0.72 K/UL
MONOCYTES NFR BLD AUTO: 7.9 %
NEUTROPHILS # BLD AUTO: 5.52 K/UL
NEUTROPHILS NFR BLD AUTO: 60.5 %
PLATELET # BLD AUTO: 244 K/UL
POTASSIUM SERPL-SCNC: 3.9 MMOL/L
PROT SERPL-MCNC: 6.9 G/DL
RBC # BLD: 5.05 M/UL
RBC # FLD: 13.7 %
SODIUM SERPL-SCNC: 142 MMOL/L
TSH SERPL-ACNC: 1.12 UIU/ML
WBC # FLD AUTO: 9.14 K/UL

## 2025-04-14 ENCOUNTER — APPOINTMENT (OUTPATIENT)
Dept: FAMILY MEDICINE | Facility: CLINIC | Age: 65
End: 2025-04-14

## 2025-04-14 ENCOUNTER — APPOINTMENT (OUTPATIENT)
Dept: PODIATRY | Facility: CLINIC | Age: 65
End: 2025-04-14
Payer: COMMERCIAL

## 2025-04-14 VITALS
DIASTOLIC BLOOD PRESSURE: 100 MMHG | HEIGHT: 64 IN | SYSTOLIC BLOOD PRESSURE: 180 MMHG | BODY MASS INDEX: 20.83 KG/M2 | WEIGHT: 122 LBS

## 2025-04-14 DIAGNOSIS — S90.852A SUPERFICIAL FOREIGN BODY, LEFT FOOT, INITIAL ENCOUNTER: ICD-10-CM

## 2025-04-14 PROCEDURE — 28190 REMOVAL OF FOOT FOREIGN BODY: CPT | Mod: LT

## 2025-04-14 PROCEDURE — 99203 OFFICE O/P NEW LOW 30 MIN: CPT | Mod: 25

## 2025-04-15 ENCOUNTER — TRANSCRIPTION ENCOUNTER (OUTPATIENT)
Age: 65
End: 2025-04-15

## 2025-04-16 ENCOUNTER — APPOINTMENT (OUTPATIENT)
Dept: INTERNAL MEDICINE | Facility: CLINIC | Age: 65
End: 2025-04-16
Payer: COMMERCIAL

## 2025-04-16 ENCOUNTER — RESULT REVIEW (OUTPATIENT)
Age: 65
End: 2025-04-16

## 2025-04-16 DIAGNOSIS — R03.0 ELEVATED BLOOD-PRESSURE READING, W/OUT DIAGNOSIS OF HYPERTENSION: ICD-10-CM

## 2025-04-16 DIAGNOSIS — F41.9 ANXIETY DISORDER, UNSPECIFIED: ICD-10-CM

## 2025-04-16 PROCEDURE — 99213 OFFICE O/P EST LOW 20 MIN: CPT | Mod: 95

## 2025-04-16 RX ORDER — BUSPIRONE HYDROCHLORIDE 5 MG/1
5 TABLET ORAL
Qty: 90 | Refills: 0 | Status: DISCONTINUED | COMMUNITY
Start: 2025-04-11 | End: 2025-04-16

## 2025-04-16 RX ORDER — LISINOPRIL 10 MG/1
10 TABLET ORAL
Qty: 90 | Refills: 0 | Status: ACTIVE | COMMUNITY
Start: 2025-04-16 | End: 1900-01-01

## 2025-04-21 ENCOUNTER — NON-APPOINTMENT (OUTPATIENT)
Age: 65
End: 2025-04-21

## 2025-04-23 ENCOUNTER — APPOINTMENT (OUTPATIENT)
Dept: ORTHOPEDIC SURGERY | Facility: CLINIC | Age: 65
End: 2025-04-23
Payer: COMMERCIAL

## 2025-04-23 DIAGNOSIS — S43.421A SPRAIN OF RIGHT ROTATOR CUFF CAPSULE, INITIAL ENCOUNTER: ICD-10-CM

## 2025-04-23 DIAGNOSIS — M19.011 PRIMARY OSTEOARTHRITIS, RIGHT SHOULDER: ICD-10-CM

## 2025-04-23 PROCEDURE — 99214 OFFICE O/P EST MOD 30 MIN: CPT | Mod: 93

## 2025-05-13 ENCOUNTER — NON-APPOINTMENT (OUTPATIENT)
Age: 65
End: 2025-05-13

## 2025-05-13 ENCOUNTER — APPOINTMENT (OUTPATIENT)
Dept: HEART AND VASCULAR | Facility: CLINIC | Age: 65
End: 2025-05-13

## 2025-05-13 VITALS
OXYGEN SATURATION: 100 % | SYSTOLIC BLOOD PRESSURE: 170 MMHG | HEART RATE: 101 BPM | BODY MASS INDEX: 20.94 KG/M2 | DIASTOLIC BLOOD PRESSURE: 80 MMHG | WEIGHT: 122 LBS

## 2025-05-13 DIAGNOSIS — Z01.810 ENCOUNTER FOR PREPROCEDURAL CARDIOVASCULAR EXAMINATION: ICD-10-CM

## 2025-05-13 DIAGNOSIS — E78.00 PURE HYPERCHOLESTEROLEMIA, UNSPECIFIED: ICD-10-CM

## 2025-05-13 PROCEDURE — G2211 COMPLEX E/M VISIT ADD ON: CPT | Mod: NC

## 2025-05-13 PROCEDURE — 93000 ELECTROCARDIOGRAM COMPLETE: CPT

## 2025-05-13 PROCEDURE — 99214 OFFICE O/P EST MOD 30 MIN: CPT

## 2025-05-14 ENCOUNTER — APPOINTMENT (OUTPATIENT)
Dept: INTERNAL MEDICINE | Facility: CLINIC | Age: 65
End: 2025-05-14
Payer: COMMERCIAL

## 2025-05-14 VITALS
HEIGHT: 64 IN | HEART RATE: 94 BPM | RESPIRATION RATE: 16 BRPM | DIASTOLIC BLOOD PRESSURE: 90 MMHG | WEIGHT: 122 LBS | BODY MASS INDEX: 20.83 KG/M2 | SYSTOLIC BLOOD PRESSURE: 178 MMHG | OXYGEN SATURATION: 98 %

## 2025-05-14 DIAGNOSIS — M25.811 OTHER SPECIFIED JOINT DISORDERS, RIGHT SHOULDER: ICD-10-CM

## 2025-05-14 DIAGNOSIS — R03.0 ELEVATED BLOOD-PRESSURE READING, W/OUT DIAGNOSIS OF HYPERTENSION: ICD-10-CM

## 2025-05-14 DIAGNOSIS — S43.421A SPRAIN OF RIGHT ROTATOR CUFF CAPSULE, INITIAL ENCOUNTER: ICD-10-CM

## 2025-05-14 DIAGNOSIS — E66.3 OVERWEIGHT: ICD-10-CM

## 2025-05-14 DIAGNOSIS — I10 ESSENTIAL (PRIMARY) HYPERTENSION: ICD-10-CM

## 2025-05-14 DIAGNOSIS — M25.511 PAIN IN RIGHT SHOULDER: ICD-10-CM

## 2025-05-14 DIAGNOSIS — S99.921A UNSPECIFIED INJURY OF RIGHT FOOT, INITIAL ENCOUNTER: ICD-10-CM

## 2025-05-14 DIAGNOSIS — S90.852A SUPERFICIAL FOREIGN BODY, LEFT FOOT, INITIAL ENCOUNTER: ICD-10-CM

## 2025-05-14 DIAGNOSIS — F41.9 ANXIETY DISORDER, UNSPECIFIED: ICD-10-CM

## 2025-05-14 PROCEDURE — 99215 OFFICE O/P EST HI 40 MIN: CPT

## 2025-05-14 RX ORDER — AMLODIPINE BESYLATE 5 MG/1
5 TABLET ORAL
Qty: 30 | Refills: 5 | Status: ACTIVE | COMMUNITY
Start: 2025-05-14 | End: 1900-01-01

## 2025-05-15 ENCOUNTER — APPOINTMENT (OUTPATIENT)
Dept: HEART AND VASCULAR | Facility: CLINIC | Age: 65
End: 2025-05-15

## 2025-05-28 ENCOUNTER — APPOINTMENT (OUTPATIENT)
Dept: CARDIOLOGY | Facility: CLINIC | Age: 65
End: 2025-05-28
Payer: COMMERCIAL

## 2025-05-28 PROCEDURE — 93306 TTE W/DOPPLER COMPLETE: CPT

## 2025-06-10 RX ORDER — TELMISARTAN 40 MG/1
40 TABLET ORAL DAILY
Qty: 30 | Refills: 5 | Status: ACTIVE | COMMUNITY
Start: 2025-06-10 | End: 1900-01-01

## 2025-06-10 RX ORDER — ATORVASTATIN CALCIUM 20 MG/1
20 TABLET, FILM COATED ORAL
Qty: 30 | Refills: 5 | Status: ACTIVE | COMMUNITY
Start: 2025-06-10 | End: 1900-01-01

## 2025-06-17 ENCOUNTER — APPOINTMENT (OUTPATIENT)
Dept: HEART AND VASCULAR | Facility: CLINIC | Age: 65
End: 2025-06-17
Payer: COMMERCIAL

## 2025-06-17 VITALS
OXYGEN SATURATION: 100 % | DIASTOLIC BLOOD PRESSURE: 80 MMHG | BODY MASS INDEX: 21.28 KG/M2 | HEART RATE: 71 BPM | SYSTOLIC BLOOD PRESSURE: 130 MMHG | WEIGHT: 124 LBS

## 2025-06-17 PROCEDURE — 99214 OFFICE O/P EST MOD 30 MIN: CPT

## 2025-06-17 PROCEDURE — G2211 COMPLEX E/M VISIT ADD ON: CPT | Mod: NC

## 2025-06-17 PROCEDURE — 93000 ELECTROCARDIOGRAM COMPLETE: CPT

## 2025-06-24 ENCOUNTER — RESULT REVIEW (OUTPATIENT)
Age: 65
End: 2025-06-24

## 2025-07-17 ENCOUNTER — RESULT REVIEW (OUTPATIENT)
Age: 65
End: 2025-07-17

## 2025-07-17 RX ORDER — CYCLOBENZAPRINE HYDROCHLORIDE 5 MG/1
5 TABLET, FILM COATED ORAL 3 TIMES DAILY
Qty: 30 | Refills: 0 | Status: ACTIVE | COMMUNITY
Start: 2025-07-17 | End: 1900-01-01

## 2025-07-17 RX ORDER — NAPROXEN 500 MG/1
500 TABLET ORAL
Qty: 60 | Refills: 0 | Status: ACTIVE | COMMUNITY
Start: 2025-07-17 | End: 1900-01-01

## 2025-07-17 RX ORDER — ONDANSETRON 4 MG/1
4 TABLET ORAL
Qty: 8 | Refills: 0 | Status: ACTIVE | COMMUNITY
Start: 2025-07-17 | End: 1900-01-01

## 2025-07-17 RX ORDER — DOCUSATE SODIUM 100 MG/1
100 CAPSULE ORAL TWICE DAILY
Qty: 30 | Refills: 0 | Status: ACTIVE | COMMUNITY
Start: 2025-07-17 | End: 1900-01-01

## 2025-07-17 RX ORDER — ACETAMINOPHEN 500 MG/1
500 TABLET ORAL
Qty: 60 | Refills: 0 | Status: ACTIVE | COMMUNITY
Start: 2025-07-17 | End: 1900-01-01

## 2025-07-17 RX ORDER — OXYCODONE 5 MG/1
5 TABLET ORAL
Qty: 40 | Refills: 0 | Status: ACTIVE | COMMUNITY
Start: 2025-07-17 | End: 1900-01-01

## 2025-07-17 RX ORDER — OMEPRAZOLE 40 MG/1
40 CAPSULE, DELAYED RELEASE ORAL DAILY
Qty: 30 | Refills: 1 | Status: ACTIVE | COMMUNITY
Start: 2025-07-17 | End: 1900-01-01

## 2025-07-17 RX ORDER — CHLORHEXIDINE GLUCONATE 4 %
5 LIQUID (ML) TOPICAL
Qty: 30 | Refills: 0 | Status: ACTIVE | COMMUNITY
Start: 2025-07-17 | End: 1900-01-01

## 2025-07-22 ENCOUNTER — RESULT REVIEW (OUTPATIENT)
Age: 65
End: 2025-07-22

## 2025-07-22 ENCOUNTER — APPOINTMENT (OUTPATIENT)
Dept: ORTHOPEDIC SURGERY | Facility: HOSPITAL | Age: 65
End: 2025-07-22
Payer: COMMERCIAL

## 2025-07-22 ENCOUNTER — TRANSCRIPTION ENCOUNTER (OUTPATIENT)
Age: 65
End: 2025-07-22

## 2025-07-22 PROCEDURE — 24301 MUSC/TDN TRANSFER UPR A/E 1: CPT | Mod: RT

## 2025-07-22 PROCEDURE — 23472 RECONSTRUCT SHOULDER JOINT: CPT | Mod: RT

## 2025-07-22 RX ORDER — GABAPENTIN 100 MG/1
100 CAPSULE ORAL TWICE DAILY
Qty: 28 | Refills: 0 | Status: ACTIVE | COMMUNITY
Start: 2025-07-22 | End: 1900-01-01

## 2025-08-04 ENCOUNTER — APPOINTMENT (OUTPATIENT)
Dept: ORTHOPEDIC SURGERY | Facility: CLINIC | Age: 65
End: 2025-08-04
Payer: COMMERCIAL

## 2025-08-04 VITALS
HEART RATE: 74 BPM | SYSTOLIC BLOOD PRESSURE: 142 MMHG | DIASTOLIC BLOOD PRESSURE: 93 MMHG | WEIGHT: 124 LBS | OXYGEN SATURATION: 100 % | RESPIRATION RATE: 14 BRPM | BODY MASS INDEX: 21.28 KG/M2

## 2025-08-04 DIAGNOSIS — M19.011 PRIMARY OSTEOARTHRITIS, RIGHT SHOULDER: ICD-10-CM

## 2025-08-04 PROCEDURE — 99024 POSTOP FOLLOW-UP VISIT: CPT

## 2025-08-04 PROCEDURE — 73030 X-RAY EXAM OF SHOULDER: CPT | Mod: RT

## 2025-09-15 ENCOUNTER — APPOINTMENT (OUTPATIENT)
Dept: ORTHOPEDIC SURGERY | Facility: CLINIC | Age: 65
End: 2025-09-15
Payer: COMMERCIAL

## 2025-09-15 VITALS
HEIGHT: 64 IN | RESPIRATION RATE: 16 BRPM | WEIGHT: 124 LBS | OXYGEN SATURATION: 99 % | HEART RATE: 77 BPM | SYSTOLIC BLOOD PRESSURE: 137 MMHG | DIASTOLIC BLOOD PRESSURE: 83 MMHG | BODY MASS INDEX: 21.17 KG/M2

## 2025-09-15 DIAGNOSIS — M19.011 PRIMARY OSTEOARTHRITIS, RIGHT SHOULDER: ICD-10-CM

## 2025-09-15 DIAGNOSIS — S43.421A SPRAIN OF RIGHT ROTATOR CUFF CAPSULE, INITIAL ENCOUNTER: ICD-10-CM

## 2025-09-15 PROCEDURE — 99024 POSTOP FOLLOW-UP VISIT: CPT
